# Patient Record
Sex: FEMALE | Race: BLACK OR AFRICAN AMERICAN | Employment: FULL TIME | URBAN - METROPOLITAN AREA
[De-identification: names, ages, dates, MRNs, and addresses within clinical notes are randomized per-mention and may not be internally consistent; named-entity substitution may affect disease eponyms.]

---

## 2017-01-03 ENCOUNTER — THERAPY VISIT (OUTPATIENT)
Dept: PHYSICAL THERAPY | Facility: CLINIC | Age: 37
End: 2017-01-03
Payer: COMMERCIAL

## 2017-01-03 ENCOUNTER — OFFICE VISIT (OUTPATIENT)
Dept: MATERNAL FETAL MEDICINE | Facility: CLINIC | Age: 37
End: 2017-01-03
Attending: OBSTETRICS & GYNECOLOGY
Payer: COMMERCIAL

## 2017-01-03 VITALS
HEART RATE: 70 BPM | RESPIRATION RATE: 20 BRPM | BODY MASS INDEX: 27.97 KG/M2 | SYSTOLIC BLOOD PRESSURE: 117 MMHG | WEIGHT: 194.9 LBS | DIASTOLIC BLOOD PRESSURE: 57 MMHG

## 2017-01-03 DIAGNOSIS — Z98.891 S/P CESAREAN SECTION: ICD-10-CM

## 2017-01-03 DIAGNOSIS — O09.819 PREGNANCY, HIGH-RISK, ASSISTED REPRODUCTIVE TECHNOLOGY: Primary | ICD-10-CM

## 2017-01-03 DIAGNOSIS — M25.552 HIP PAIN, LEFT: Primary | ICD-10-CM

## 2017-01-03 PROCEDURE — 99211 OFF/OP EST MAY X REQ PHY/QHP: CPT | Mod: 25,ZF

## 2017-01-03 PROCEDURE — 97110 THERAPEUTIC EXERCISES: CPT | Mod: GP | Performed by: PHYSICAL THERAPIST

## 2017-01-03 NOTE — PROGRESS NOTES
2 week Postpartum Visit Note    S:  Ms. Judd Nelson is a 36 year old  here for her postpartum visit, POD#14. Ms. Nelson is doing very well today, she is still ambulating with help of a walker, but continues to see PT and reports much improvement in pain and function of her hip. She has been tolerating much more of a diet, eating small meals throughout the day, though still doesn't usually feel very hungry. She denies incisional pain, and is no longer taking narcotic medications. Only reports minimal vaginal bleeding. She denies headache/chest pain or shortness of breath.     Babies are doing well in the NICU - feeding/growing; moved to [facility]. She has been pumping for them with some success. Denies breast soreness/tenderness.   We discussed contraception as well, Ms. Nelson would like paragard IUD after reviewing different methods and risks/benefits of each.       O:  /57 mmHg  Pulse 70  Resp 20  Wt 88.406 kg (194 lb 14.4 oz)  LMP 2016  Gen: Well-appearing, NAD  Breast: Lactating  Abd:  Benign, Soft, flat, non-tender and No masses, organomegaly  Inc:   well healed; c/d/i; steris remained; were removed    G tube site well healed  Ext:  warm, NT, neg edema    A/P:  Ms. Judd Nelson is a 36 year old  here for 2 week post partum visit after  delivery of a tri/tri triplet pregnancy, complicated by uterine atony and PPH. Pregnancy also complicated by GHTN. Recovering well today.  - VSS, normotensive; currently taking 200mg labetalol. Asymptomatic. Plan made today to discontinue labetalol and to continue blood pressure checks with report if noted elevated BPs. (140s - 160s/90s-110s).  - Contraception: Ms. Nelson would like the paragard IUD, scheduled today for a 6 week post partum visit with [facility].  - Continue PT for hip  - GI: Continues to tolerate diet well s/p G tube removal  - Feeding: Pumping for babies currently; planning to breastfeed.   - Follow-up: with [facility] at 6 week post  partum check    I acted as a scribe for Dr. Stiles during today's visit    E Hari FERRO  M Fellow    This note, as scribed, accurately reflects the examination, my impressions and plan as discussed with the patient.    Silva Stiles MD  Specialist in Maternal-Fetal Medicine

## 2017-01-03 NOTE — NURSING NOTE
RN F:F 15 min  Judd here for PPV due to preg c/b triplet delivery via C/S. Pt also has CHTN, and hip pain.  Pt reports that she is having some hip pain and is in PT weekly and doing exercises. Pt reports no more N/V and is eating and drinking well.  Judd is pumping breast milk for her babies.  Her babies are still in the NICU and were transferred to [facility].  Judd is walking with a walker and going to Pt weekly.  Dr. Stiles in to see pt.  Pt discussed birth control and was given a business card from [facility].  Judd left amb and stable. Cheyenne Kate RN

## 2017-01-13 ENCOUNTER — THERAPY VISIT (OUTPATIENT)
Dept: PHYSICAL THERAPY | Facility: CLINIC | Age: 37
End: 2017-01-13
Payer: COMMERCIAL

## 2017-01-13 DIAGNOSIS — M25.552 HIP PAIN, LEFT: Primary | ICD-10-CM

## 2017-01-13 PROCEDURE — 97110 THERAPEUTIC EXERCISES: CPT | Mod: GP | Performed by: PHYSICAL THERAPIST

## 2017-01-31 ENCOUNTER — OFFICE VISIT (OUTPATIENT)
Dept: OBGYN | Facility: CLINIC | Age: 37
End: 2017-01-31
Attending: MIDWIFE
Payer: COMMERCIAL

## 2017-01-31 VITALS
WEIGHT: 194.4 LBS | BODY MASS INDEX: 27.83 KG/M2 | DIASTOLIC BLOOD PRESSURE: 80 MMHG | SYSTOLIC BLOOD PRESSURE: 123 MMHG | HEIGHT: 70 IN

## 2017-01-31 DIAGNOSIS — Z30.430 ENCOUNTER FOR IUD INSERTION: Primary | ICD-10-CM

## 2017-01-31 LAB
HCG UR QL: NEGATIVE
INTERNAL QC OK POCT: YES

## 2017-01-31 PROCEDURE — 58300 INSERT INTRAUTERINE DEVICE: CPT | Mod: ZF | Performed by: MIDWIFE

## 2017-01-31 PROCEDURE — 81025 URINE PREGNANCY TEST: CPT | Mod: ZF | Performed by: MIDWIFE

## 2017-01-31 PROCEDURE — 25000125 ZZHC RX 250: Mod: ZF

## 2017-01-31 PROCEDURE — 99212 OFFICE O/P EST SF 10 MIN: CPT | Mod: 25,ZF

## 2017-01-31 RX ORDER — COPPER 313.4 MG/1
1 INTRAUTERINE DEVICE INTRAUTERINE ONCE
Qty: 1 EACH | Refills: 0
Start: 2017-01-31 | End: 2017-01-31

## 2017-01-31 ASSESSMENT — ANXIETY QUESTIONNAIRES
7. FEELING AFRAID AS IF SOMETHING AWFUL MIGHT HAPPEN: NOT AT ALL
1. FEELING NERVOUS, ANXIOUS, OR ON EDGE: NOT AT ALL
3. WORRYING TOO MUCH ABOUT DIFFERENT THINGS: NOT AT ALL
6. BECOMING EASILY ANNOYED OR IRRITABLE: NOT AT ALL
2. NOT BEING ABLE TO STOP OR CONTROL WORRYING: NOT AT ALL
GAD7 TOTAL SCORE: 1
5. BEING SO RESTLESS THAT IT IS HARD TO SIT STILL: NOT AT ALL

## 2017-01-31 ASSESSMENT — PATIENT HEALTH QUESTIONNAIRE - PHQ9: 5. POOR APPETITE OR OVEREATING: SEVERAL DAYS

## 2017-01-31 ASSESSMENT — PAIN SCALES - GENERAL: PAINLEVEL: NO PAIN (0)

## 2017-01-31 NOTE — Clinical Note
"2017       RE: Judd Nelson  [address] [facility] [address]     Dear Colleague,    Thank you for referring your patient, Judd Nelson, to the [facility] at [facility]. Please see a copy of my visit note below.      SUBJECTIVE: Judd Nelson is a 36 year old  female, requests paragard contraception   See hx c/s of triplets 6 weeks ago     Verification of Procedure:  Just before the procedure began through verbal and active participation of team members, I verified:     Initials   Patient Name dr   Patient  dr   Procedure to be performed dr     Consent:  Risks, benefits of treatment, and alternative options for contraception were discussed.  Patient's questions were elicited and answered.  Written consent was obtained and scanned into medical record.       OBJECTIVE: /80 mmHg  Ht 1.778 m (5' 10\")  Wt 88.179 kg (194 lb 6.4 oz)  BMI 27.89 kg/m2  LMP 2016    Pelvic Exam:  EG/BUS: Normal genital architecture without lesions, erythema or abnormal secretions. Bartholin's, Urethra, [place]'s glands are normal.   Vagina: moist, pink, rugae with creamy, white and odorlesssecretions  Cervix: Nulliparous,, no lesions and pink, moist, closed, without lesion or CMT  Uterus: anteverted,  and small, smooth, firm, mobile w/o pain   Adnexa: Within normal limits and No masses, nodularity, tenderness  Rectum: anus normal      PROCEDURE NOTE  --  ParaGard Insertion    Reason for Insertion:  contraception.    Pregnancy test: Negative    Counseling:  Patient counseled on efficacy, benefits, risks, potential side effects of IUD.  Insertion procedure and risk of infection, perforation, and spontaneous expulsion reviewed.   Advised to plan removal and/or replacement of IUD in 10 years from today or when desired.       Judd  was pre-medicated with ibuprofen  prior to beginning the procedure.      Under sterile technique, cervix was visualized with a " medium Graves speculum and prepped with Betadine solution. The uterus was sounded to 7 cm. The ParaGard IUD insertion apparatus was prepared and placed through the cervix without significant resistance and deployed at the fundus in the usual fashion. The strings were trimmed 3 cm from the external os.      Device Lot #: [id number]  Device Expiration Date: 2023     EBL:  Minimal     Complications:  None      Judd Nelson tolerated procedure well.    PLAN:      Written information on IUD use reviewed and given.  Symptoms to report reviewed. To report heavy bleeding, severe cramping, or abnormal vaginal discharge.  May take Ibuprofen 400-800 mg PO TID PRN or Naproxen 500 mg PO BID for cramping. Reminded to check for IUD strings every month.  Return to clinic in 4-6 weeks for string check. Judd Nelson  verbalized understanding of instructions.    Nursing Notes:   Yuli Lozada LPN  2017  2:28 PM  Signed  SUBJECTIVE:   Judd Nelson is here for her 6-week postpartum checkup.     PHQ-9 score:   Hx of Abuse:  No    Delivery Date: 2016.    Delivering provider:  Dr. Alfaro.    Type of delivery:  .  TrIplets   Delivery complications: blood lose  Was given two units during surgery  Then one more unit two days later.  Some abdmonial tenderness  Infant gender:  Boy Skinny, weight 3 pounds 12 oz. Boy Hamza  3#8 oz    GIrl Ambika  3#  Feeding Method:   and Bottlefed.  Complications reported with feeding:  Triplets lots of time.    Bleeding:  None.  Duration:  5-6 weeks  Had spotting yesterday .  Menses resumed:  No  Bowel/Urinary problems:  No    Contraception Planned:  IUD Paragard  She  has had intercourse since delivery and experienced  moderate discomfort.    Used condoms.       ================================================================  ROS: 10 point ROS neg other than the symptoms noted above in the HPI.   CONSTITUTIONAL: negative  RESPIRATORY: negative  CARDIOVASCULAR: negative  GI:  "negative  : negative  MUSCULO-SKELETAL: negative  SKIN: negative  ALLERGY/IMMUN: negative  PSYCHIATRIC: negative    EXAM:  /80 mmHg  Ht 1.778 m (5' 10\")  Wt 88.179 kg (194 lb 6.4 oz)  BMI 27.89 kg/m2  LMP 02/20/2016    General: healthy, alert and no distress  Psych: NEGATIVE  Last PHQ-9 score on record= 2  Breasts:  Lactating, Nipples intact with no lesions, Non-tender and No S/S of yeast or mastitis  Abdomen: Benign, Soft, flat, non-tender, No masses, organomegaly and Diastasis less than 1-2 FB  Incision:  well healed   Vulva:  Normal genitalia and Bartholin's, Urethra, [place]'s normal  Vagina:  normal with good muscle tone and without discharge  Cervix:  Multiparous,, no lesions and pink, moist, closed, without lesion or CMT.    Uterus:  fully involuted and non-tender    Adnexa:  Within normal limits and No masses, nodularity, tenderness  Recto-vaginal:   anus normal  See separate procedure note for IUD placement         ASSESSMENT:   Encounter Diagnosis   Name Primary?     Encounter for IUD insertion Yes      Normal postpartum exam after c/s for obstetrical complications  Pregnancy was complicated by: severe hypermemesis  GI tube PICC extended hospitalizations , transaminitis, hepatitis C< cholestasis, chronic HTN .      PLAN:  Orders Placed This Encounter   Procedures     hCG qual urine POCT            Risks and benefits of prescribed medications discussed.  Medication instructions reviewed.  Discussed calcium intake, vitamins and supplements including Vitamin D  Exercise encouraged  Follow up in 1 year      Diya ROBBINS             "

## 2017-01-31 NOTE — NURSING NOTE
SUBJECTIVE:   Judd Nelson is here for her 6-week postpartum checkup.     PHQ-9 score:   Hx of Abuse:  No    Delivery Date: 2016.    Delivering provider:  Dr. Alfaro.    Type of delivery:  .  TrIplets   Delivery complications: blood lose  Was given two units during surgery  Then one more unit two days later.  Some abdmonial tenderness  Infant gender:  Boy Skinny, weight 3 pounds 12 oz. Boy Juno  3#8 oz    GIrl Ambika  3#  Feeding Method:   and Bottlefed.  Complications reported with feeding:  Triplets lots of time.    Bleeding:  None.  Duration:  5-6 weeks  Had spotting yesterday .  Menses resumed:  No  Bowel/Urinary problems:  No    Contraception Planned:  IUD Paragard  She  has had intercourse since delivery and experienced  moderate discomfort.    Used condoms.

## 2017-01-31 NOTE — PROGRESS NOTES
"  SUBJECTIVE: Judd Nelson is a 36 year old  female, requests paragard contraception   See hx c/s of triplets 6 weeks ago     Verification of Procedure:  Just before the procedure began through verbal and active participation of team members, I verified:     Initials   Patient Name dr   Patient     Procedure to be performed      Consent:  Risks, benefits of treatment, and alternative options for contraception were discussed.  Patient's questions were elicited and answered.  Written consent was obtained and scanned into medical record.       OBJECTIVE: /80 mmHg  Ht 1.778 m (5' 10\")  Wt 88.179 kg (194 lb 6.4 oz)  BMI 27.89 kg/m2  LMP 2016    Pelvic Exam:  EG/BUS: Normal genital architecture without lesions, erythema or abnormal secretions. Bartholin's, Urethra, [place]'s glands are normal.   Vagina: moist, pink, rugae with creamy, white and odorlesssecretions  Cervix: Nulliparous,, no lesions and pink, moist, closed, without lesion or CMT  Uterus: anteverted,  and small, smooth, firm, mobile w/o pain   Adnexa: Within normal limits and No masses, nodularity, tenderness  Rectum: anus normal      PROCEDURE NOTE  --  ParaGard Insertion    Reason for Insertion:  contraception.    Pregnancy test: Negative    Counseling:  Patient counseled on efficacy, benefits, risks, potential side effects of IUD.  Insertion procedure and risk of infection, perforation, and spontaneous expulsion reviewed.   Advised to plan removal and/or replacement of IUD in 10 years from today or when desired.       Judd  was pre-medicated with ibuprofen  prior to beginning the procedure.      Under sterile technique, cervix was visualized with a medium Graves speculum and prepped with Betadine solution. The uterus was sounded to 7 cm. The ParaGard IUD insertion apparatus was prepared and placed through the cervix without significant resistance and deployed at the fundus in the usual fashion. The strings were trimmed 3 cm from " "the external os.      Device Lot #: [id number]  Device Expiration Date: 2023     EBL:  Minimal     Complications:  None      Judd Nelson tolerated procedure well.    PLAN:      Written information on IUD use reviewed and given.  Symptoms to report reviewed. To report heavy bleeding, severe cramping, or abnormal vaginal discharge.  May take Ibuprofen 400-800 mg PO TID PRN or Naproxen 500 mg PO BID for cramping. Reminded to check for IUD strings every month.  Return to clinic in 4-6 weeks for string check. Judd Nelson  verbalized understanding of instructions.    Nursing Notes:   Yuli Lozada LPN  2017  2:28 PM  Signed  SUBJECTIVE:   Judd Nelson is here for her 6-week postpartum checkup.     PHQ-9 score:   Hx of Abuse:  No    Delivery Date: 2016.    Delivering provider:  Dr. Alfaro.    Type of delivery:  .  TrIplets   Delivery complications: blood lose  Was given two units during surgery  Then one more unit two days later.  Some abdmonial tenderness  Infant gender:  Boy Babb, weight 3 pounds 12 oz. Boy Hamza  3#8 oz    GIrl Ambika  3#  Feeding Method:   and Bottlefed.  Complications reported with feeding:  Triplets lots of time.    Bleeding:  None.  Duration:  5-6 weeks  Had spotting yesterday .  Menses resumed:  No  Bowel/Urinary problems:  No    Contraception Planned:  IUD Paragard  She  has had intercourse since delivery and experienced  moderate discomfort.    Used condoms.       ================================================================  ROS: 10 point ROS neg other than the symptoms noted above in the HPI.   CONSTITUTIONAL: negative  RESPIRATORY: negative  CARDIOVASCULAR: negative  GI: negative  : negative  MUSCULO-SKELETAL: negative  SKIN: negative  ALLERGY/IMMUN: negative  PSYCHIATRIC: negative    EXAM:  /80 mmHg  Ht 1.778 m (5' 10\")  Wt 88.179 kg (194 lb 6.4 oz)  BMI 27.89 kg/m2  LMP 2016    General: healthy, alert and no distress  Psych: NEGATIVE  Last " PHQ-9 score on record= 2  Breasts:  Lactating, Nipples intact with no lesions, Non-tender and No S/S of yeast or mastitis  Abdomen: Benign, Soft, flat, non-tender, No masses, organomegaly and Diastasis less than 1-2 FB  Incision:  well healed   Vulva:  Normal genitalia and Bartholin's, Urethra, [place]'s normal  Vagina:  normal with good muscle tone and without discharge  Cervix:  Multiparous,, no lesions and pink, moist, closed, without lesion or CMT.    Uterus:  fully involuted and non-tender    Adnexa:  Within normal limits and No masses, nodularity, tenderness  Recto-vaginal:   anus normal  See separate procedure note for IUD placement         ASSESSMENT:   Encounter Diagnosis   Name Primary?     Encounter for IUD insertion Yes      Normal postpartum exam after c/s for obstetrical complications  Pregnancy was complicated by: severe hypermemesis  GI tube PICC extended hospitalizations , transaminitis, hepatitis C< cholestasis, chronic HTN .      PLAN:  Orders Placed This Encounter   Procedures     hCG qual urine POCT            Risks and benefits of prescribed medications discussed.  Medication instructions reviewed.  Discussed calcium intake, vitamins and supplements including Vitamin D  Exercise encouraged  Follow up in 1 year  Diya Lau CNM APRN

## 2017-02-01 ASSESSMENT — ANXIETY QUESTIONNAIRES: GAD7 TOTAL SCORE: 1

## 2017-02-01 ASSESSMENT — PATIENT HEALTH QUESTIONNAIRE - PHQ9: SUM OF ALL RESPONSES TO PHQ QUESTIONS 1-9: 5

## 2017-02-02 ENCOUNTER — TELEPHONE (OUTPATIENT)
Dept: OBGYN | Facility: CLINIC | Age: 37
End: 2017-02-02

## 2017-02-02 NOTE — TELEPHONE ENCOUNTER
Spoke to Judd who had IUD placed 1/31/17 and experiencing vaginal bleeding like her period. Reassured  bleeding can be heaviest in first 3 months after insertion. It is not heavier than normal period and not soaking thru maxi pad in an hr and no clots larger than golf ball. She is experiencing mild cramping. Instructed to call back if meets any of the listed criteria.Pt indicated understanding and agreed with plan.

## 2017-03-20 ENCOUNTER — OFFICE VISIT (OUTPATIENT)
Dept: OBGYN | Facility: CLINIC | Age: 37
End: 2017-03-20
Attending: MIDWIFE
Payer: COMMERCIAL

## 2017-03-20 DIAGNOSIS — Z30.431 IUD CHECK UP: Primary | ICD-10-CM

## 2017-03-20 PROCEDURE — 99212 OFFICE O/P EST SF 10 MIN: CPT | Mod: ZF

## 2017-03-20 NOTE — MR AVS SNAPSHOT
After Visit Summary   3/20/2017    Judd Nelson    MRN: [id number]           Patient Information     Date Of Birth          1980        Visit Information        Provider Department      3/20/2017 2:15 PM Alicia Lau APRN CNM [facility]        Today's Diagnoses     IUD check up    -  1      Care Instructions      IUD pamphlet reviewed and given to patient.        Follow-ups after your visit        Follow-up notes from your care team     Return in about 1 year (around 3/20/2018).      Who to contact     Please call your clinic at [phone] to:    Ask questions about your health    Make or cancel appointments    Discuss your medicines    Learn about your test results    Speak to your doctor   If you have compliments or concerns about an experience at your clinic, or if you wish to file a complaint, please contact [facility] Patient Relations at [phone] or email us at [email or web address]         Additional Information About Your Visit        MyChart Information     [employer] gives you secure access to your electronic health record. If you see a primary care provider, you can also send messages to your care team and make appointments. If you have questions, please call your primary care clinic.  If you do not have a primary care provider, please call [phone] and they will assist you.      [employer] is an electronic gateway that provides easy, online access to your medical records. With [employer], you can request a clinic appointment, read your test results, renew a prescription or communicate with your care team.     To access your existing account, please contact your [facility] or call [phone] for assistance.        Care EveryWhere ID     This is your Care EveryWhere ID. This could be used by other organizations to access your [facility] medical records  [id number]         Blood Pressure from  Last 3 Encounters:   01/31/17 123/80   01/03/17 117/57   12/22/16 133/79    Weight from Last 3 Encounters:   01/31/17 88.2 kg (194 lb 6.4 oz)   01/03/17 88.4 kg (194 lb 14.4 oz)   12/20/16 92.5 kg (203 lb 14.4 oz)              Today, you had the following     No orders found for display       Primary Care Provider Office Phone # Fax #    Vonda Pop [phone]       [facility]    [address] [facility] [address]  [place] [address]        Thank you!     Thank you for choosing [facility]  for your care. Our goal is always to provide you with excellent care. Hearing back from our patients is one way we can continue to improve our services. Please take a few minutes to complete the written survey that you may receive in the mail after your visit with us. Thank you!             Your Updated Medication List - Protect others around you: Learn how to safely use, store and throw away your medicines at www.disposemymeds.org.          This list is accurate as of: 3/20/17  3:18 PM.  Always use your most recent med list.                   Brand Name Dispense Instructions for use    ibuprofen 600 MG tablet    ADVIL/MOTRIN    60 tablet    Take 1 tablet (600 mg) by mouth every 6 hours as needed for moderate pain

## 2017-03-20 NOTE — PROGRESS NOTES
S:  Pt is here for IUD string check.  Requesting to have the strings trimmed today.  Spouse is able to feel strings during intercourse.  Pt has triplet babies 3 mos old  Mom is still here helping  Her mother in law will be coming soon.  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   She has had 2 menses since placement.   She is happy with the method.  O:  Spec exam  Strings visualized at os.  Trimmed to 1.5cm.   Normal scant d/c  A:  IUD string check  P:  RTC prn .    Diya Lau CNM APRN   ROS    S:  Pt is here for IUD string check.  Requesting to have the strings trimmed today.  Spouse is able to feel strings during intercourse.  Pt has triplet babies 3 mos old  Mom is still here helping  Her mother in law will be coming soon.  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   She has had 2 menses since placement.   She is happy with the method.  O:  Spec exam  Strings visualized at os.  Trimmed to 1.5cm.   Normal scant d/c  A:  IUD string check  P:  RTC prn .    Diya Lau CNM APRN

## 2017-03-20 NOTE — LETTER
3/20/2017       RE: Judd Nelson  [address] [facility] [address]     Dear Colleague,    Thank you for referring your patient, Judd Nelson, to the [facility] at [facility]. Please see a copy of my visit note below.      S:  Pt is here for IUD string check.  Requesting to have the strings trimmed today.  Spouse is able to feel strings during intercourse.  Pt has triplet babies 3 mos old  Mom is still here helping  Her mother in law will be coming soon.  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   She has had 2 menses since placement.   She is happy with the method.  O:  Spec exam  Strings visualized at os.  Trimmed to 1.5cm.   Normal scant d/c  A:  IUD string check  P:  RTC prn .    Diya Lau CNM APRN   ROS    S:  Pt is here for IUD string check.  Requesting to have the strings trimmed today.  Spouse is able to feel strings during intercourse.  Pt has triplet babies 3 mos old  Mom is still here helping  Her mother in law will be coming soon.  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   She has had 2 menses since placement.   She is happy with the method.  O:  Spec exam  Strings visualized at os.  Trimmed to 1.5cm.   Normal scant d/c  A:  IUD string check  P:  RTC prn .

## 2017-04-12 ENCOUNTER — MEDICAL CORRESPONDENCE (OUTPATIENT)
Dept: HEALTH INFORMATION MANAGEMENT | Facility: CLINIC | Age: 37
End: 2017-04-12

## 2018-03-27 ENCOUNTER — OFFICE VISIT (OUTPATIENT)
Dept: FAMILY MEDICINE | Facility: CLINIC | Age: 38
End: 2018-03-27
Payer: COMMERCIAL

## 2018-03-27 VITALS
HEIGHT: 69 IN | DIASTOLIC BLOOD PRESSURE: 60 MMHG | SYSTOLIC BLOOD PRESSURE: 92 MMHG | BODY MASS INDEX: 34.44 KG/M2 | TEMPERATURE: 97.5 F | OXYGEN SATURATION: 98 % | WEIGHT: 232.5 LBS | HEART RATE: 71 BPM

## 2018-03-27 DIAGNOSIS — D64.9 ANEMIA, UNSPECIFIED TYPE: ICD-10-CM

## 2018-03-27 DIAGNOSIS — Z00.00 ROUTINE HISTORY AND PHYSICAL EXAMINATION OF ADULT: Primary | ICD-10-CM

## 2018-03-27 DIAGNOSIS — Z13.29 SCREENING FOR THYROID DISORDER: ICD-10-CM

## 2018-03-27 DIAGNOSIS — K75.9 HEPATITIS: ICD-10-CM

## 2018-03-27 DIAGNOSIS — R74.8 ELEVATED LIVER ENZYMES: ICD-10-CM

## 2018-03-27 DIAGNOSIS — Z97.5 IUD (INTRAUTERINE DEVICE) IN PLACE: ICD-10-CM

## 2018-03-27 DIAGNOSIS — Z13.220 LIPID SCREENING: ICD-10-CM

## 2018-03-27 LAB
ALBUMIN SERPL-MCNC: 3.9 G/DL (ref 3.4–5)
ALP SERPL-CCNC: 81 U/L (ref 40–150)
ALT SERPL W P-5'-P-CCNC: 17 U/L (ref 0–50)
ANION GAP SERPL CALCULATED.3IONS-SCNC: 8 MMOL/L (ref 3–14)
AST SERPL W P-5'-P-CCNC: 17 U/L (ref 0–45)
BILIRUB SERPL-MCNC: 0.3 MG/DL (ref 0.2–1.3)
BUN SERPL-MCNC: 11 MG/DL (ref 7–30)
CALCIUM SERPL-MCNC: 8.7 MG/DL (ref 8.5–10.1)
CHLORIDE SERPL-SCNC: 108 MMOL/L (ref 94–109)
CHOLEST SERPL-MCNC: 179 MG/DL
CO2 SERPL-SCNC: 22 MMOL/L (ref 20–32)
CREAT SERPL-MCNC: 0.42 MG/DL (ref 0.52–1.04)
ERYTHROCYTE [DISTWIDTH] IN BLOOD BY AUTOMATED COUNT: 14.4 % (ref 10–15)
FERRITIN SERPL-MCNC: 10 NG/ML (ref 12–150)
GFR SERPL CREATININE-BSD FRML MDRD: >90 ML/MIN/1.7M2
GLUCOSE SERPL-MCNC: 84 MG/DL (ref 70–99)
HCT VFR BLD AUTO: 40.5 % (ref 35–47)
HCV AB SERPL QL IA: REACTIVE
HDLC SERPL-MCNC: 55 MG/DL
HGB BLD-MCNC: 12.9 G/DL (ref 11.7–15.7)
LDLC SERPL CALC-MCNC: 108 MG/DL
MCH RBC QN AUTO: 28.3 PG (ref 26.5–33)
MCHC RBC AUTO-ENTMCNC: 31.9 G/DL (ref 31.5–36.5)
MCV RBC AUTO: 89 FL (ref 78–100)
NONHDLC SERPL-MCNC: 124 MG/DL
PLATELET # BLD AUTO: 288 10E9/L (ref 150–450)
POTASSIUM SERPL-SCNC: 4.4 MMOL/L (ref 3.4–5.3)
PROT SERPL-MCNC: 7.4 G/DL (ref 6.8–8.8)
RBC # BLD AUTO: 4.56 10E12/L (ref 3.8–5.2)
SODIUM SERPL-SCNC: 138 MMOL/L (ref 133–144)
TRIGL SERPL-MCNC: 79 MG/DL
TSH SERPL DL<=0.005 MIU/L-ACNC: 0.57 MU/L (ref 0.4–4)
WBC # BLD AUTO: 7.7 10E9/L (ref 4–11)

## 2018-03-27 PROCEDURE — 85027 COMPLETE CBC AUTOMATED: CPT | Performed by: INTERNAL MEDICINE

## 2018-03-27 PROCEDURE — 80061 LIPID PANEL: CPT | Performed by: INTERNAL MEDICINE

## 2018-03-27 PROCEDURE — 86803 HEPATITIS C AB TEST: CPT | Performed by: INTERNAL MEDICINE

## 2018-03-27 PROCEDURE — 36415 COLL VENOUS BLD VENIPUNCTURE: CPT | Performed by: INTERNAL MEDICINE

## 2018-03-27 PROCEDURE — 84443 ASSAY THYROID STIM HORMONE: CPT | Performed by: INTERNAL MEDICINE

## 2018-03-27 PROCEDURE — 99213 OFFICE O/P EST LOW 20 MIN: CPT | Mod: 25 | Performed by: INTERNAL MEDICINE

## 2018-03-27 PROCEDURE — 80053 COMPREHEN METABOLIC PANEL: CPT | Performed by: INTERNAL MEDICINE

## 2018-03-27 PROCEDURE — 87522 HEPATITIS C REVRS TRNSCRPJ: CPT | Performed by: INTERNAL MEDICINE

## 2018-03-27 PROCEDURE — 82728 ASSAY OF FERRITIN: CPT | Performed by: INTERNAL MEDICINE

## 2018-03-27 PROCEDURE — 99395 PREV VISIT EST AGE 18-39: CPT | Performed by: INTERNAL MEDICINE

## 2018-03-27 NOTE — PROGRESS NOTES
SUBJECTIVE:   CC: Judd Nelson is an 37 year old woman who presents for preventive health visit.     Healthy Habits:    Do you get at least three servings of calcium containing foods daily (dairy, green leafy vegetables, etc.)? 1 daily    Amount of exercise or daily activities, outside of work: trying treadmill daily    Problems taking medications regularly not applicable    Medication side effects: No    Have you had an eye exam in the past two years? yes    Do you see a dentist twice per year? yes    Do you have sleep apnea, excessive snoring or daytime drowsiness?no    IUD Complications  Patient is complaining of menorrhagia and dyspareunia since IUD insertion  Wants it removed    Today's PHQ-2 Score:   PHQ-2 (  [employer]) 3/27/2018 3/22/2016   Q1: Little interest or pleasure in doing things 0 0   Q2: Feeling down, depressed or hopeless 0 0   PHQ-2 Score 0 0       Abuse: Current or Past(Physical, Sexual or Emotional)- No  Do you feel safe in your environment - Yes    Social History   Substance Use Topics     Smoking status: Never Smoker     Smokeless tobacco: Never Used     Alcohol use No     If you drink alcohol do you typically have >3 drinks per day or >7 drinks per week? No                     Reviewed orders with patient.  Reviewed health maintenance and updated orders accordingly - Yes  Labs reviewed in EPIC  Patient Active Problem List   Diagnosis     Obesity     Advance Care Planning     S/P  section     Hip pain, left     Past Surgical History:   Procedure Laterality Date      SECTION N/A 2016    Procedure:  SECTION;  Surgeon: Cheyenne Alfaro MD;  Location:  [facility]     ENDOSCOPIC INSERTION TUBE JEJUNOSTOMY N/A 2016    Procedure: ENDOSCOPIC INSERTION TUBE JEJUNOSTOMY;  Surgeon: Chet Tavera MD;  Location: [facility]     GYN SURGERY      hysteroscopic polypectomy     HC REPLACE DUODENOSTOMY/JEJUNOSTOMY TUBE PERCUTANEOUS N/A 2016    Procedure: REPLACE  GASTROJEJUNOSTOMY TUBE, PERCUTANEOUS;  Surgeon: Weston Rios MD;  Location: [facility]     PERCUTANEOUS BIOPSY LIVER N/A 9/8/2016    Procedure: PERCUTANEOUS BIOPSY LIVER;  Surgeon: Weston Rios MD;  Location: [facility]       Social History   Substance Use Topics     Smoking status: Never Smoker     Smokeless tobacco: Never Used     Alcohol use No     Family History   Problem Relation Age of Onset     DIABETES Father      Hypertension Father      Dementia Father      Neurologic Disorder Brother      epilepsy     Neurologic Disorder Brother      epilepsy     CANCER Mother      Endometrial     Hypertension Mother      DIABETES Sister      Cardiovascular Maternal Grandmother          Current Outpatient Prescriptions   Medication Sig Dispense Refill     ibuprofen (ADVIL/MOTRIN) 600 MG tablet Take 1 tablet (600 mg) by mouth every 6 hours as needed for moderate pain 60 tablet 1     Allergies   Allergen Reactions     No Known Allergies        Mammogram not appropriate for this patient based on age.    Pertinent mammograms are reviewed under the imaging tab.  History of abnormal Pap smear:   NO - age 30- 65 PAP every 3 years recommended  Last 3 Pap and HPV Results:   PAP / HPV 12/1/2015 3/16/2011 1/15/2009   PAP NIL NIL NIL       Reviewed and updated as needed this visit by clinical staff  Tobacco  Allergies  Meds  Soc Hx        Reviewed and updated as needed this visit by Provider            ROS:  C: NEGATIVE for fever, chills, change in weight  I: NEGATIVE for worrisome rashes, moles or lesions  E: NEGATIVE for vision changes or irritation  ENT: NEGATIVE for ear, mouth and throat problems  R: NEGATIVE for significant cough or SOB  B: NEGATIVE for masses, tenderness or discharge  CV: NEGATIVE for chest pain, palpitations or peripheral edema  GI: NEGATIVE for nausea, abdominal pain, heartburn, or change in bowel habits  : POSITIVE for menorrhagia, dyspareunia  M: NEGATIVE for significant arthralgias or  "myalgia  N: NEGATIVE for weakness, dizziness or paresthesias  P: NEGATIVE for changes in mood or affect    This document serves as a record of the services and decisions personally performed and made by Vonda Pop MD. It was created on her behalf by Cheyenne Richter, a trained medical scribe. The creation of this document is based on the provider's statements to the medical scribe.  Cheyenne Richter 8:51 AM March 27, 2018    OBJECTIVE:   BP 92/60 (BP Location: Right arm, Cuff Size: Adult Large)  Pulse 71  Temp 97.5  F (36.4  C) (Oral)  Ht 1.74 m (5' 8.5\")  Wt 105.5 kg (232 lb 8 oz)  LMP 03/11/2018 (Exact Date)  SpO2 98%  BMI 34.84 kg/m2  EXAM:  GENERAL: healthy, alert and no distress  EYES: Eyes grossly normal to inspection, PERRL and conjunctivae and sclerae normal  HENT: ear canals and TM's normal, nose and mouth without ulcers or lesions  NECK: no adenopathy, no asymmetry, masses, or scars and thyroid normal to palpation  RESP: lungs clear to auscultation - no rales, rhonchi or wheezes  BREAST: left breast prominently larger than right breast, no abnormal masses felt, tenderness or nipple discharge and no palpable axillary masses or adenopathy  This difference is since birth  CV: regular rate and rhythm, normal S1 S2, no S3 or S4, no murmur, click or rub, no peripheral edema and peripheral pulses strong  ABDOMEN: soft, nontender, no hepatosplenomegaly, no masses and bowel sounds normal  MS: no gross musculoskeletal defects noted, no edema  SKIN: no suspicious lesions or rashes  NEURO: Normal strength and tone, mentation intact and speech normal  PSYCH: mentation appears normal, affect normal/bright   exam deferred to gynecology as she will see them for removal of IUD  ASSESSMENT/PLAN:   Judd was seen today for physical.    Diagnoses and all orders for this visit:    Routine history and physical examination of adult  Patient came in today for a wellness visit  Was treated for infertility, and had " "triplets recently    IUD (intrauterine device) in place  -     OB/GYN REFERRAL  Patient is complaining of menorrhagia and dyspareunia since IUD insertion  Wants it removed  Referred her to [facility]  She will schedule it at her convenience  Explained to her that her period will probably become regular after removal  Discussed mirena IUD but patient declined.    Anemia, unspecified type  -     CBC with platelets  -     Ferritin    Hepatitis( past history during pregnancy and patient had work up done)  -     Comprehensive metabolic panel  -     Hepatitis C Screen Reflex to HCV RNA Quant and Genotype  Patient had a falsely positive Hepatitis C test before  Re-check so it can be taken off patient's problem list    Screening for thyroid disorder  -     TSH with free T4 reflex    Lipid screening  -     Lipid panel reflex to direct LDL Non-fasting    Elevated liver enzymes  -     Comprehensive metabolic panel  -     Hepatitis C Screen Reflex to HCV RNA Quant and Genotype  Patient had history of elevated liver enzymes during pregnency so will recheck      Patient Instructions   Labs today  Schedule an appointment for OB/GYN at your earliest convenience  Follow up in one year for physical  Seek sooner medical attention if there is any worsening of symptoms or problems    COUNSELING:   Reviewed preventive health counseling, as reflected in patient instructions       Regular exercise       Healthy diet/nutrition         reports that she has never smoked. She has never used smokeless tobacco.    Estimated body mass index is 34.84 kg/(m^2) as calculated from the following:    Height as of this encounter: 1.74 m (5' 8.5\").    Weight as of this encounter: 105.5 kg (232 lb 8 oz).   Weight management plan: Discussed healthy diet and exercise guidelines and patient will follow up in 12 months in clinic to re-evaluate.    Counseling Resources:  ATP IV Guidelines  Pooled Cohorts Equation Calculator  Breast Cancer Risk " Calculator  FRAX Risk Assessment  ICSI Preventive Guidelines  Dietary Guidelines for Americans, 2010  USDA's MyPlate  ASA Prophylaxis  Lung CA Screening    The information in this document, created by the medical scribe for me, accurately reflects the services I personally performed and the decisions made by me. I have reviewed and approved this document for accuracy prior to leaving the patient care area.  March 27, 2018 9:10 AM    Vonda Pop MD  [facility]

## 2018-03-27 NOTE — MR AVS SNAPSHOT
After Visit Summary   3/27/2018    Judd Nelson    MRN: [id number]           Patient Information     Date Of Birth          1980        Visit Information        Provider Department      3/27/2018 8:30 AM Vonda Pop MD [facility]        Today's Diagnoses     Routine history and physical examination of adult    -  1    IUD (intrauterine device) in place        Anemia, unspecified type        Hepatitis        Screening for thyroid disorder        Lipid screening        Elevated liver enzymes          Care Instructions      Preventive Health Recommendations  Female Ages 26 - 39  Yearly exam:   See your health care provider every year in order to    Review health changes.     Discuss preventive care.      Review your medicines if you your doctor has prescribed any.    Until age 30: Get a Pap test every three years (more often if you have had an abnormal result).    After age 30: Talk to your doctor about whether you should have a Pap test every 3 years or have a Pap test with HPV screening every 5 years.   You do not need a Pap test if your uterus was removed (hysterectomy) and you have not had cancer.  You should be tested each year for STDs (sexually transmitted diseases), if you're at risk.   Talk to your provider about how often to have your cholesterol checked.  If you are at risk for diabetes, you should have a diabetes test (fasting glucose).  Shots: Get a flu shot each year. Get a tetanus shot every 10 years.   Nutrition:     Eat at least 5 servings of fruits and vegetables each day.    Eat whole-grain bread, whole-wheat pasta and brown rice instead of white grains and rice.    Talk to your provider about Calcium and Vitamin D.     Lifestyle    Exercise at least 150 minutes a week (30 minutes a day, 5 days of the week). This will help you control your weight and prevent disease.    Limit alcohol to one drink per day.    No smoking.     Wear sunscreen to prevent skin  cancer.    See your dentist every six months for an exam and cleaning.    Labs today  Schedule an appointment for OB/GYN at your earliest convenience  Follow up in one year for physical  Seek sooner medical attention if there is any worsening of symptoms or problems  Your BMI is Body mass index is 34.84 kg/(m^2).  Weight management is a personal decision.  If you are interested in exploring weight loss strategies, the following discussion covers the approaches that may be successful. Body mass index (BMI) is one way to tell whether you are at a healthy weight, overweight, or obese. It measures your weight in relation to your height.  A BMI of 18.5 to 24.9 is in the healthy range. A person with a BMI of 25 to 29.9 is considered overweight, and someone with a BMI of 30 or greater is considered obese. More than two-thirds of American adults are considered overweight or obese.  Being overweight or obese increases the risk for further weight gain. Excess weight may lead to heart disease and diabetes.  Creating and following plans for healthy eating and physical activity may help you improve your health.  Weight control is part of healthy lifestyle and includes exercise, emotional health, and healthy eating habits. Careful eating habits lifelong are the mainstay of weight control. Though there are significant health benefits from weight loss, long-term weight loss with diet alone may be very difficult to achieve- studies show long-term success with dietary management in less than 10% of people. Attaining a healthy weight may be especially difficult to achieve in those with severe obesity. In some cases, medications, devices and surgical management might be considered.  What can you do?  If you are overweight or obese and are interested in methods for weight loss, you should discuss this with your provider.     Consider reducing daily calorie intake by 500 calories.     Keep a food journal.     Avoiding skipping meals,  consider cutting portions instead.    Diet combined with exercise helps maintain muscle while optimizing fat loss. Strength training is particularly important for building and maintaining muscle mass. Exercise helps reduce stress, increase energy, and improves fitness. Increasing exercise without diet control, however, may not burn enough calories to loose weight.       Start walking three days a week 10-20 minutes at a time    Work towards walking thirty minutes five days a week     Eventually, increase the speed of your walking for 1-2 minutes at time    In addition, we recommend that you review healthy lifestyles and methods for weight loss available through the National Institutes of Health patient information sites:  http://win.niddk.nih.gov/publications/index.htm    And look into health and wellness programs that may be available through your health insurance provider, employer, local community center, or dominic club.                Follow-ups after your visit        Additional Services     OB/GYN REFERRAL       Your provider has referred you to:  [facility]: [facility] Manjeet Nichols [phone]  [email or web address]    Please be aware that coverage of these services is subject to the terms and limitations of your health insurance plan.  Call member services at your health plan with any benefit or coverage questions.      Please bring the following with you to your appointment:    (1) Any X-Rays, CTs or MRIs which have been performed.  Contact the facility where they were done to arrange for  prior to your scheduled appointment.   (2) List of current medications   (3) This referral request   (4) Any documents/labs given to you for this referral                  Who to contact     If you have questions or need follow up information about today's clinic visit or your schedule please contact [facility] directly at [phone].  Normal or non-critical lab  "and imaging results will be communicated to you by MyChart, letter or phone within 4 business days after the clinic has received the results. If you do not hear from us within 7 days, please contact the clinic through [employer] or phone. If you have a critical or abnormal lab result, we will notify you by phone as soon as possible.  Submit refill requests through [employer] or call your pharmacy and they will forward the refill request to us. Please allow 3 business days for your refill to be completed.          Additional Information About Your Visit        [email or web address] Information     [employer] gives you secure access to your electronic health record. If you see a primary care provider, you can also send messages to your care team and make appointments. If you have questions, please call your primary care clinic.  If you do not have a primary care provider, please call [phone] and they will assist you.        Care EveryWhere ID     This is your Care EveryWhere ID. This could be used by other organizations to access your [facility] medical records  [id number]        Your Vitals Were     Pulse Temperature Height Last Period Pulse Oximetry BMI (Body Mass Index)    71 97.5  F (36.4  C) (Oral) 5' 8.5\" (1.74 m) 03/11/2018 (Exact Date) 98% 34.84 kg/m2       Blood Pressure from Last 3 Encounters:   03/27/18 92/60   01/31/17 123/80   01/03/17 117/57    Weight from Last 3 Encounters:   03/27/18 232 lb 8 oz (105.5 kg)   01/31/17 194 lb 6.4 oz (88.2 kg)   01/03/17 194 lb 14.4 oz (88.4 kg)              We Performed the Following     CBC with platelets     Comprehensive metabolic panel     Ferritin     Hepatitis C Screen Reflex to HCV RNA Quant and Genotype     Lipid panel reflex to direct LDL Non-fasting     OB/GYN REFERRAL     TSH with free T4 reflex        Primary Care Provider Office Phone # Fax #    Vonda Pop [phone] [address]  [facility] [address]        Equal Access to " Services     [facility]: Hadii tiffanie Cedeno, wabravoda luqadaha, qaybta kaalmasmiley hunt, rajan herr. So [facility] [phone].    ATENCIÓN: Si habla español, tiene a greene disposición servicios gratuitos de asistencia lingüística. Llame al [phone].    We comply with applicable federal civil rights laws and [place] laws. We do not discriminate on the basis of race, color, national origin, age, disability, sex, sexual orientation, or gender identity.            Thank you!     Thank you for choosing [facility]  for your care. Our goal is always to provide you with excellent care. Hearing back from our patients is one way we can continue to improve our services. Please take a few minutes to complete the written survey that you may receive in the mail after your visit with us. Thank you!             Your Updated Medication List - Protect others around you: Learn how to safely use, store and throw away your medicines at www.disposemymeds.org.          This list is accurate as of 3/27/18  9:07 AM.  Always use your most recent med list.                   Brand Name Dispense Instructions for use Diagnosis    ibuprofen 600 MG tablet    ADVIL/MOTRIN    60 tablet    Take 1 tablet (600 mg) by mouth every 6 hours as needed for moderate pain    S/P  section

## 2018-03-27 NOTE — PATIENT INSTRUCTIONS
Preventive Health Recommendations  Female Ages 26 - 39  Yearly exam:   See your health care provider every year in order to    Review health changes.     Discuss preventive care.      Review your medicines if you your doctor has prescribed any.    Until age 30: Get a Pap test every three years (more often if you have had an abnormal result).    After age 30: Talk to your doctor about whether you should have a Pap test every 3 years or have a Pap test with HPV screening every 5 years.   You do not need a Pap test if your uterus was removed (hysterectomy) and you have not had cancer.  You should be tested each year for STDs (sexually transmitted diseases), if you're at risk.   Talk to your provider about how often to have your cholesterol checked.  If you are at risk for diabetes, you should have a diabetes test (fasting glucose).  Shots: Get a flu shot each year. Get a tetanus shot every 10 years.   Nutrition:     Eat at least 5 servings of fruits and vegetables each day.    Eat whole-grain bread, whole-wheat pasta and brown rice instead of white grains and rice.    Talk to your provider about Calcium and Vitamin D.     Lifestyle    Exercise at least 150 minutes a week (30 minutes a day, 5 days of the week). This will help you control your weight and prevent disease.    Limit alcohol to one drink per day.    No smoking.     Wear sunscreen to prevent skin cancer.    See your dentist every six months for an exam and cleaning.    Labs today  Schedule an appointment for OB/GYN at your earliest convenience  Follow up in one year for physical  Seek sooner medical attention if there is any worsening of symptoms or problems  Your BMI is Body mass index is 34.84 kg/(m^2).  Weight management is a personal decision.  If you are interested in exploring weight loss strategies, the following discussion covers the approaches that may be successful. Body mass index (BMI) is one way to tell whether you are at a healthy weight,  overweight, or obese. It measures your weight in relation to your height.  A BMI of 18.5 to 24.9 is in the healthy range. A person with a BMI of 25 to 29.9 is considered overweight, and someone with a BMI of 30 or greater is considered obese. More than two-thirds of American adults are considered overweight or obese.  Being overweight or obese increases the risk for further weight gain. Excess weight may lead to heart disease and diabetes.  Creating and following plans for healthy eating and physical activity may help you improve your health.  Weight control is part of healthy lifestyle and includes exercise, emotional health, and healthy eating habits. Careful eating habits lifelong are the mainstay of weight control. Though there are significant health benefits from weight loss, long-term weight loss with diet alone may be very difficult to achieve- studies show long-term success with dietary management in less than 10% of people. Attaining a healthy weight may be especially difficult to achieve in those with severe obesity. In some cases, medications, devices and surgical management might be considered.  What can you do?  If you are overweight or obese and are interested in methods for weight loss, you should discuss this with your provider.     Consider reducing daily calorie intake by 500 calories.     Keep a food journal.     Avoiding skipping meals, consider cutting portions instead.    Diet combined with exercise helps maintain muscle while optimizing fat loss. Strength training is particularly important for building and maintaining muscle mass. Exercise helps reduce stress, increase energy, and improves fitness. Increasing exercise without diet control, however, may not burn enough calories to loose weight.       Start walking three days a week 10-20 minutes at a time    Work towards walking thirty minutes five days a week     Eventually, increase the speed of your walking for 1-2 minutes at time    In  addition, we recommend that you review healthy lifestyles and methods for weight loss available through the National Institutes of Health patient information sites:  http://win.niddk.nih.gov/publications/index.htm    And look into health and wellness programs that may be available through your health insurance provider, employer, local community center, or dominic club.

## 2018-03-28 NOTE — PROGRESS NOTES
Alexandro Whaley,    This is to inform you regarding your test result.    I spoke to you on phone but sending you a result note also.  Ferritin which is iron stores in the body is low.  We want Ferritin level greater than   Take OTC Ferrous Sulfate 325 mg po once  daily if you tolerate.  Take with vit C as vit C helps to absorb iron.  Iron can make you constipated so take stool softener.  Recheck ferritin in 3- 4 months  TSH which is thyroid hormone is normal.  The testing of your kidney function, liver function and electrolytes was satisfactory   CBC result which includes Hemoglobin and  Platelet Counts is satisfactory.  Hepatitis C antibody is positive but confirmatory test is pending as I discussed with you  I will let you know when that result become available  Please make follow-up appointment in 3-4 months    Sincerely,      Dr.Nasima Kiesha MD,FACP

## 2018-03-29 LAB
HCV RNA SERPL NAA+PROBE-ACNC: NORMAL [IU]/ML
HCV RNA SERPL NAA+PROBE-LOG IU: NORMAL LOG IU/ML

## 2018-03-30 DIAGNOSIS — Z53.9 ERRONEOUS ENCOUNTER--DISREGARD: Primary | ICD-10-CM

## 2018-03-30 NOTE — PROGRESS NOTES
Alexandro Whaley,    This is to inform you regarding your test result.    I spoke to you on phone but sending you a result note also.  Hepatitis C antibody was reactive but  But hepatitis C RNA not detected.  You do not have any hepatitis C        Sincerely,      Dr.Nasima Kiesha MD,FACP

## 2018-04-04 ENCOUNTER — OFFICE VISIT (OUTPATIENT)
Dept: OBGYN | Facility: CLINIC | Age: 38
End: 2018-04-04
Payer: COMMERCIAL

## 2018-04-04 VITALS
SYSTOLIC BLOOD PRESSURE: 104 MMHG | BODY MASS INDEX: 34.13 KG/M2 | DIASTOLIC BLOOD PRESSURE: 60 MMHG | WEIGHT: 238.4 LBS | HEIGHT: 70 IN

## 2018-04-04 DIAGNOSIS — Z80.49 FAMILY HISTORY OF UTERINE CANCER: ICD-10-CM

## 2018-04-04 DIAGNOSIS — Z12.4 SCREENING FOR CERVICAL CANCER: ICD-10-CM

## 2018-04-04 DIAGNOSIS — N92.0 MENORRHAGIA WITH REGULAR CYCLE: Primary | ICD-10-CM

## 2018-04-04 DIAGNOSIS — Z30.432 ENCOUNTER FOR IUD REMOVAL: ICD-10-CM

## 2018-04-04 PROCEDURE — 99203 OFFICE O/P NEW LOW 30 MIN: CPT | Mod: 25 | Performed by: OBSTETRICS & GYNECOLOGY

## 2018-04-04 PROCEDURE — G0145 SCR C/V CYTO,THINLAYER,RESCR: HCPCS | Performed by: OBSTETRICS & GYNECOLOGY

## 2018-04-04 PROCEDURE — 58301 REMOVE INTRAUTERINE DEVICE: CPT | Performed by: OBSTETRICS & GYNECOLOGY

## 2018-04-04 PROCEDURE — 87624 HPV HI-RISK TYP POOLED RSLT: CPT | Performed by: OBSTETRICS & GYNECOLOGY

## 2018-04-04 RX ORDER — COPPER 313.4 MG/1
1 INTRAUTERINE DEVICE INTRAUTERINE ONCE
COMMUNITY

## 2018-04-04 ASSESSMENT — ANXIETY QUESTIONNAIRES
3. WORRYING TOO MUCH ABOUT DIFFERENT THINGS: NOT AT ALL
IF YOU CHECKED OFF ANY PROBLEMS ON THIS QUESTIONNAIRE, HOW DIFFICULT HAVE THESE PROBLEMS MADE IT FOR YOU TO DO YOUR WORK, TAKE CARE OF THINGS AT HOME, OR GET ALONG WITH OTHER PEOPLE: SOMEWHAT DIFFICULT
1. FEELING NERVOUS, ANXIOUS, OR ON EDGE: NOT AT ALL
6. BECOMING EASILY ANNOYED OR IRRITABLE: SEVERAL DAYS
7. FEELING AFRAID AS IF SOMETHING AWFUL MIGHT HAPPEN: SEVERAL DAYS
2. NOT BEING ABLE TO STOP OR CONTROL WORRYING: NOT AT ALL
GAD7 TOTAL SCORE: 3
5. BEING SO RESTLESS THAT IT IS HARD TO SIT STILL: NOT AT ALL

## 2018-04-04 ASSESSMENT — PATIENT HEALTH QUESTIONNAIRE - PHQ9: 5. POOR APPETITE OR OVEREATING: SEVERAL DAYS

## 2018-04-04 NOTE — MR AVS SNAPSHOT
"              After Visit Summary   4/4/2018    Judd Nelson    MRN: [id number]           Patient Information     Date Of Birth          1980        Visit Information        Provider Department      4/4/2018 2:40 PM Reshma Everett MD [facility] [place]        Today's Diagnoses     Menorrhagia with regular cycle    -  1    Screening for cervical cancer        Encounter for IUD removal        Family history of uterine cancer           Follow-ups after your visit        Who to contact     If you have questions or need follow up information about today's clinic visit or your schedule please contact [facility] [place] directly at [phone].  Normal or non-critical lab and imaging results will be communicated to you by MyChart, letter or phone within 4 business days after the clinic has received the results. If you do not hear from us within 7 days, please contact the clinic through [employer] or phone. If you have a critical or abnormal lab result, we will notify you by phone as soon as possible.  Submit refill requests through [employer] or call your pharmacy and they will forward the refill request to us. Please allow 3 business days for your refill to be completed.          Additional Information About Your Visit        MyChart Information     [employer] gives you secure access to your electronic health record. If you see a primary care provider, you can also send messages to your care team and make appointments. If you have questions, please call your primary care clinic.  If you do not have a primary care provider, please call [phone] and they will assist you.        Care EveryWhere ID     This is your Care EveryWhere ID. This could be used by other organizations to access your [facility] medical records  [id number]        Your Vitals Were     Height Last Period BMI (Body Mass Index)             5' 9.5\" (1.765 m) 03/12/2018 34.7 kg/m2          Blood Pressure from Last 3 Encounters: "   18 104/60   18 92/60   17 123/80    Weight from Last 3 Encounters:   18 238 lb 6.4 oz (108.1 kg)   18 232 lb 8 oz (105.5 kg)   17 194 lb 6.4 oz (88.2 kg)              We Performed the Following     HPV High Risk Types DNA Cervical     IUD REMOVAL     Pap imaged thin layer screen with HPV - recommended age 30 - 65        Primary Care Provider Office Phone # Fax #    Vonda MILLI Pop [phone] [address] [facility] [address]  [place]                [place] [address]        Equal Access to Services     [facility] : Hadii tiffanie Cedeno, steve burnham, jessica hunt, rajan herr. So [facility] [phone].    ATENCIÓN: Si habla español, tiene a greene disposición servicios gratuitos de asistencia lingüística. Llame al [phone].    We comply with applicable federal civil rights laws and [place] laws. We do not discriminate on the basis of race, color, national origin, age, disability, sex, sexual orientation, or gender identity.            Thank you!     Thank you for choosing [facility] [place]  for your care. Our goal is always to provide you with excellent care. Hearing back from our patients is one way we can continue to improve our services. Please take a few minutes to complete the written survey that you may receive in the mail after your visit with us. Thank you!             Your Updated Medication List - Protect others around you: Learn how to safely use, store and throw away your medicines at www.disposemymeds.org.          This list is accurate as of 18  3:34 PM.  Always use your most recent med list.                   Brand Name Dispense Instructions for use Diagnosis    ibuprofen 600 MG tablet    ADVIL/MOTRIN    60 tablet    Take 1 tablet (600 mg) by mouth every 6 hours as needed for moderate pain    S/P  section       paragard intrauterine copper      1 each by Intrauterine route once

## 2018-04-04 NOTE — PROGRESS NOTES
SUBJECTIVE:                                                   Judd Nelson is a 37 year old female who presents to clinic today for the following health issue(s):  Patient presents with:  IUD: patient would like IUD removed. has had paragard for a year now-causing heavy and long periods. 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   Patient is sexually active, .  Using IUD for contraception.    reports that she has never smoked. She has never used smokeless tobacco.    STD testing offered?  Declined    Health maintenance updated:  yes    Today's PHQ-2 Score:   PHQ-2 (  [employer]) 3/27/2018   Q1: Little interest or pleasure in doing things 0   Q2: Feeling down, depressed or hopeless 0   PHQ-2 Score 0     Today's PHQ-9 Score:   PHQ-9 SCORE 2018   Total Score -   Total Score 3     Today's JAZZY-7 Score:   JAZZY-7 SCORE 2018   Total Score 3       Problem list and histories reviewed & adjusted, as indicated.  Additional history: as documented.    Patient Active Problem List   Diagnosis     Obesity     Advance Care Planning     S/P  section     Hip pain, left     Family history of uterine cancer     Menorrhagia with regular cycle     Past Surgical History:   Procedure Laterality Date      SECTION N/A 2016    Procedure:  SECTION;  Surgeon: Cheyenne Alfaro MD;   "[facility]     ENDOSCOPIC INSERTION TUBE JEJUNOSTOMY N/A 8/9/2016    Procedure: ENDOSCOPIC INSERTION TUBE JEJUNOSTOMY;  Surgeon: Chet Tavera MD;  [facility] OR     GYN SURGERY      hysteroscopic polypectomy     HC REPLACE DUODENOSTOMY/JEJUNOSTOMY TUBE PERCUTANEOUS N/A 9/8/2016    Procedure: REPLACE GASTROJEJUNOSTOMY TUBE, PERCUTANEOUS;  Surgeon: Weston Rios MD;  Location: [facility] OR     PERCUTANEOUS BIOPSY LIVER N/A 9/8/2016    Procedure: PERCUTANEOUS BIOPSY LIVER;  Surgeon: Weston Rios MD;  Location: [facility] OR      Social History   Substance Use Topics     Smoking status: Never Smoker     Smokeless tobacco: Never Used     Alcohol use No      Problem (# of Occurrences) Relation (Name,Age of Onset)    CANCER (1) Mother: Endometrial    Cardiovascular (1) Maternal Grandmother    DIABETES (2) Father, Sister    Dementia (1) Father    Hypertension (2) Father, Mother    Neurologic Disorder (2) Brother: epilepsy, Brother: epilepsy            Current Outpatient Prescriptions   Medication Sig     paragard intrauterine copper 1 each by Intrauterine route once     ibuprofen (ADVIL/MOTRIN) 600 MG tablet Take 1 tablet (600 mg) by mouth every 6 hours as needed for moderate pain     No current facility-administered medications for this visit.      Allergies   Allergen Reactions     No Known Allergies        ROS:  12 point review of systems negative other than symptoms noted below.  Constitutional: Weight Gain  Genitourinary: Heavy Bleeding with Period and Painful Rembrandt    OBJECTIVE:     /60  Ht 5' 9.5\" (1.765 m)  Wt 238 lb 6.4 oz (108.1 kg)  LMP 03/12/2018  BMI 34.7 kg/m2  Body mass index is 34.7 kg/(m^2).    Exam:  Constitutional:  Appearance: Well nourished, well developed alert, in no acute distress  Chest:  Respiratory Effort:  Breathing unlabored  Gastrointestinal:  Abdominal Examination:  Abdomen nontender to palpation, tone normal without rigidity or guarding, no masses present, " umbilicus without lesions; Liver/Spleen:  No hepatomegaly present, liver nontender to palpation; Hernias:  No hernias present  Lymphatic: Lymph Nodes:  No other lymphadenopathy present  Skin:General Inspection:  No rashes present, no lesions present, no areas of discoloration; Genitalia and Groin:  No rashes present, no lesions present, no areas of discoloration, no masses present.  Neurologic/Psychiatric:  Mental Status:  Oriented X3   Pelvic Exam:  External Genitalia:     Normal appearance for age, no discharge present, no tenderness present, no inflammatory lesions present, color normal  Vagina:    Normal vaginal vault without central or paravaginal defects, no discharge present, no inflammatory lesions present, no masses present  Bladder:     Nontender to palpation  Urethra:   Urethral Body:  Urethra palpation normal, urethra structural support normal   Urethral Meatus:  No erythema or lesions present  Cervix:     Appearance healthy, no lesions present, nontender to palpation, no bleeding present, string present  Uterus:     Nontender to palpation, no masses present, position anteflexed, mobility: normal  Adnexa:     No adnexal tenderness present, no adnexal masses present  Perineum:     Perineum within normal limits, no evidence of trauma, no rashes or skin lesions present  Anus:     Anus within normal limits, no hemorrhoids present  Inguinal Lymph Nodes:     No lymphadenopathy present  Pubic Hair:     Normal pubic hair distribution for age  Genitalia and Groin:     No rashes present, no lesions present, no areas of discoloration, no masses present       In-Clinic Test Results:  No results found for this or any previous visit (from the past 24 hour(s)).    ASSESSMENT/PLAN:                                                        ICD-10-CM    1. Menorrhagia with regular cycle N92.0    2. Screening for cervical cancer Z12.4 Pap imaged thin layer screen with HPV - recommended age 30 - 65     HPV High Risk Types DNA  Cervical   3. Encounter for IUD removal Z30.432 IUD REMOVAL   4. Family history of uterine cancer Z80.49        There are no Patient Instructions on file for this visit.    Discussed menorrhagia, pap guidelines, family history of uterine cancer and her prior liver issues  Pap and hpv done  iud removed  Discussed fertility history  Reviewed her outside records and all prior lab results  Needs hpv testing q 5y, may be a good idea to do the pap more often since family history of uterine cancer  If her periods stay heavy despite iud removal, I would recommend further evaluation with Sonohistogram here and endometrial biopsy due to her mom getting uterine cancer that was very advanced at an abnormally young age.  Patient says her periods were ok before babies and iud.      Reshma Everett MD  [facility]        INDICATIONS:                                                      Is a pregnancy test required: No.  Was a consent obtained?  Yes    Judd Nelson is a 37 year old female,, Patient's last menstrual period was 2018. who presents today for IUD removal. Her current IUD was placed a year ago. She has had problems including heavy and long periods with the IUD. She requests removal of the IUD because of problems stated above    Today's PHQ-2 Score:   PHQ-2 (  [employer]) 3/27/2018   Q1: Little interest or pleasure in doing things 0   Q2: Feeling down, depressed or hopeless 0   PHQ-2 Score 0       PROCEDURE:                                                      A speculum exam was performed and the cervix was visualized. The IUD string was visualized. Using ring forceps, the string  was grasped and the IUD removed intact.    POST PROCEDURE:                                                      The patient experienced moderate discomfort during the procedure. Patient was discharged in stable condition.    Call if bleeding, pain or fever occur. and Birth control counseling given.    Reshma JEONG  MD [place]

## 2018-04-04 NOTE — LETTER
April 11, 2018    Judd Nelson  [address] [address]    Dear Judd,  We are happy to inform you that your PAP smear result from 04/04/18 is normal.  We are now able to do a follow up test on PAP smears. The DNA test is for HPV (Human Papilloma Virus). Cervical cancer is closely linked with certain types of HPV. Your results showed no evidence of high risk HPV.  Therefore we recommend you return in 3 years for your next pap smear.  You will still need to return to the clinic every year for an annual exam and other preventive tests.  Please contact the clinic at [phone] with any questions.  Sincerely,    Reshma Everett MD/[facility]

## 2018-04-05 ASSESSMENT — PATIENT HEALTH QUESTIONNAIRE - PHQ9: SUM OF ALL RESPONSES TO PHQ QUESTIONS 1-9: 3

## 2018-04-05 ASSESSMENT — ANXIETY QUESTIONNAIRES: GAD7 TOTAL SCORE: 3

## 2018-04-06 LAB
COPATH REPORT: NORMAL
PAP: NORMAL

## 2018-04-09 LAB
FINAL DIAGNOSIS: NORMAL
HPV HR 12 DNA CVX QL NAA+PROBE: NEGATIVE
HPV16 DNA SPEC QL NAA+PROBE: NEGATIVE
HPV18 DNA SPEC QL NAA+PROBE: NEGATIVE
SPECIMEN DESCRIPTION: NORMAL
SPECIMEN SOURCE CVX/VAG CYTO: NORMAL

## 2018-10-16 NOTE — PROGRESS NOTES
"Subjective:  HPI                    Objective:  System    Physical Exam    General     ROS    Assessment/Plan:    DISCHARGE REPORT    Progress reporting period is from 12-27-16 to 1-13-17. (Pt was seen for 3 PT visits in this timeframe.)    SUBJECTIVE  Subjective: \"Much better.\" Walking without walker now. Can lift leg in/out of bed without UE assist. Dressing is easier. Doing stretches 3/xday.   Current Pain level: 2/10   Initial Pain level: 8/10   Changes in function: Yes, see goal flow sheet for change in function   Adverse reactions: None;   ,     Patient has failed to return to therapy so current objective findings are unknown.  The subjective and objective information are from the last SOAP note on this patient.    OBJECTIVE  Objective: Gait: no AD, mild decreased stance time on left. Able to lie supine with left hip down flat into normal extension. PROM: L hip flex=90 degrees, then painful in groin. IR and ER PROM cont per eval. Repeated hip ext in kneeling and ER with foot on stool both improve hip motion. Progressed to strengthening.       ASSESSMENT/PLAN  Updated problem list and treatment plan: Diagnosis 1:  Hip pain    STG/LTGs have been met or progress has been made towards goals:  Yes (See Goal flow sheet.)  Assessment of Progress: The patient has not returned to therapy. Current status is unknown.  Self Management Plans:  Patient has been instructed in a home treatment program.  Patient  has been instructed in self management of symptoms.    [facility] continues to require the following intervention to meet STG and LTG's: PT intervention is no longer required to meet STG/LTG.  The patient failed to complete scheduled/ordered appointments so current information is unknown.  We will discharge this patient from PT.  At her last visit she was improving well and stated she would call if she needed more help.    Recommendations:  This patient is ready to be discharged from therapy and continue their home " treatment program.    Please refer to the daily flowsheet for treatment today, total treatment time and time spent performing 1:1 timed codes.

## 2019-12-15 ENCOUNTER — ANCILLARY PROCEDURE (OUTPATIENT)
Dept: GENERAL RADIOLOGY | Facility: CLINIC | Age: 39
End: 2019-12-15
Attending: STUDENT IN AN ORGANIZED HEALTH CARE EDUCATION/TRAINING PROGRAM
Payer: COMMERCIAL

## 2019-12-15 ENCOUNTER — OFFICE VISIT (OUTPATIENT)
Dept: URGENT CARE | Facility: URGENT CARE | Age: 39
End: 2019-12-15
Payer: COMMERCIAL

## 2019-12-15 VITALS
RESPIRATION RATE: 20 BRPM | WEIGHT: 252 LBS | BODY MASS INDEX: 36.08 KG/M2 | SYSTOLIC BLOOD PRESSURE: 110 MMHG | DIASTOLIC BLOOD PRESSURE: 70 MMHG | HEIGHT: 70 IN | TEMPERATURE: 99.6 F | HEART RATE: 86 BPM | OXYGEN SATURATION: 99 %

## 2019-12-15 DIAGNOSIS — J18.9 PNEUMONIA OF BOTH LOWER LOBES DUE TO INFECTIOUS ORGANISM: ICD-10-CM

## 2019-12-15 DIAGNOSIS — J18.9 PNEUMONIA OF BOTH LOWER LOBES DUE TO INFECTIOUS ORGANISM: Primary | ICD-10-CM

## 2019-12-15 PROCEDURE — 71046 X-RAY EXAM CHEST 2 VIEWS: CPT

## 2019-12-15 PROCEDURE — 99203 OFFICE O/P NEW LOW 30 MIN: CPT | Performed by: STUDENT IN AN ORGANIZED HEALTH CARE EDUCATION/TRAINING PROGRAM

## 2019-12-15 RX ORDER — AZITHROMYCIN 250 MG/1
TABLET, FILM COATED ORAL
Qty: 6 TABLET | Refills: 0 | Status: SHIPPED | OUTPATIENT
Start: 2019-12-15 | End: 2019-12-19

## 2019-12-15 ASSESSMENT — ENCOUNTER SYMPTOMS
ABDOMINAL PAIN: 1
ARTHRALGIAS: 1
SORE THROAT: 1
COUGH: 1
VOMITING: 0
CONSTIPATION: 0
TROUBLE SWALLOWING: 0
DYSURIA: 0
SINUS PRESSURE: 0
SHORTNESS OF BREATH: 1
SEIZURES: 0
EYE PAIN: 0
NAUSEA: 0
HEMATURIA: 0
BACK PAIN: 1
SINUS PAIN: 0
DIARRHEA: 0
PALPITATIONS: 0
COLOR CHANGE: 0
CHILLS: 0
FATIGUE: 1
FEVER: 1

## 2019-12-15 ASSESSMENT — MIFFLIN-ST. JEOR: SCORE: 1900.37

## 2019-12-15 NOTE — PROGRESS NOTES
SUBJECTIVE:   Judd Nelson is a 39 year old female presenting with a chief complaint of   Chief Complaint   Patient presents with     Cough     cough for past week,fever for 2 days       She is an established patient of [facility].    URI Adult    Onset of symptoms was 3 day(s) ago.  Course of illness is worsening.    Severity moderate  Current and Associated symptoms: fever of 102-101, chills, cough - productive, shortness of breath, sore throat, body aches and fatigue. Patient notes pain with deep inspiration  Treatment measures tried include Tylenol/Ibuprofen and Fluids.  Predisposing factors include ill contact: Family member- children and  sick .  Patient denies any ear or face pain. Denies nausea vomiting diarrhea and constipation      Review of Systems   Constitutional: Positive for fatigue and fever. Negative for chills.   HENT: Positive for sore throat. Negative for ear pain, sinus pressure, sinus pain and trouble swallowing.    Eyes: Negative for pain and visual disturbance.   Respiratory: Positive for cough and shortness of breath.    Cardiovascular: Positive for chest pain. Negative for palpitations.   Gastrointestinal: Positive for abdominal pain. Negative for constipation, diarrhea, nausea and vomiting.   Genitourinary: Negative for dysuria and hematuria.   Musculoskeletal: Positive for arthralgias and back pain.   Skin: Negative for color change and rash.   Neurological: Negative for seizures and syncope.   All other systems reviewed and are negative.      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      Positive PPD      Severe hyperemesis gravidarum     GI tube placed during triplet  "pregnancy loss of 50#     Transaminitis     liver bx 9/8/16 resolving hepatitis     Family History   Problem Relation Age of Onset     Diabetes Father      Hypertension Father      Dementia Father      Neurologic Disorder Brother         epilepsy     Neurologic Disorder Brother         epilepsy     Cancer Mother         Endometrial     Hypertension Mother      Diabetes Sister      Cardiovascular Maternal Grandmother      Current Outpatient Medications   Medication Sig Dispense Refill     azithromycin (ZITHROMAX) 250 MG tablet Take 2 tablets (500 mg) by mouth daily for 1 day, THEN 1 tablet (250 mg) daily for 4 days. 6 tablet 0     ibuprofen (ADVIL/MOTRIN) 600 MG tablet Take 1 tablet (600 mg) by mouth every 6 hours as needed for moderate pain 60 tablet 1     paragard intrauterine copper 1 each by Intrauterine route once       Social History     Tobacco Use     Smoking status: Never Smoker     Smokeless tobacco: Never Used   Substance Use Topics     Alcohol use: No     Alcohol/week: 0.0 standard drinks       OBJECTIVE  /70 (Cuff Size: Adult Large)   Pulse 86   Temp 99.6  F (37.6  C) (Oral)   Resp 20   Ht 1.765 m (5' 9.5\")   Wt 114.3 kg (252 lb)   SpO2 99%   BMI 36.68 kg/m      Physical Exam   Constitutional: Awake, alert, cooperative, no apparent distress, and appears stated age.  Eyes: Lids and lashes normal, sclera clear, conjunctiva normal.  ENT: Normocephalic, without obvious abnormality, atramatic, TMs clear with good landmarks, tonsils 2 + with no erythema or exudate  Lungs: No increased work of breathing, good air exchange, mild right crackles heard over left lower lobe, no wheezing. Clear otherwise  Cardiovascular: Regular rate and rhythm, normal S1 and S2, no S3 or S4, and no murmur noted.  Abdomen:normal bowel sounds, soft, non-distended, non-tender, no masses palpated, no hepatosplenomegally.  Musculoskeletal: No redness, warmth, or swelling of the joints.  Full range of motion noted. " Neurologic: Awake, alert, oriented to name, place and time.  Cranial nerves II-XII are grossly intact and gait is normal.    Labs:  No results found for this or any previous visit (from the past 24 hour(s)).    X-Ray was done, my findings are: Left lower lobe pneumonia    ASSESSMENT:      ICD-10-CM    1. Pneumonia of both lower lobes due to infectious organism (H) J18.1 XR Chest 2 Views     azithromycin (ZITHROMAX) 250 MG tablet        Medical Decision Making:    Differential Diagnosis:  URI Adult/Peds:  Bronchitis-viral, Influenza, Pneumonia and Viral upper respiratory illness    Serious Comorbid Conditions:  Adult:  None    PLAN:    URI Adult:  Tylenol, Ibuprofen, Fluids, Rest and Rx pneumonia - healthy  Azithromycin Z-tara     Followup:    If not improving or if condition worsens, follow up with your Primary Care Provider, In 1  week(s) follow up with  your Primary Care Provider    There are no Patient Instructions on file for this visit.     Maryse Aaron DO

## 2019-12-19 ENCOUNTER — OFFICE VISIT (OUTPATIENT)
Dept: FAMILY MEDICINE | Facility: CLINIC | Age: 39
End: 2019-12-19
Payer: COMMERCIAL

## 2019-12-19 VITALS
WEIGHT: 254 LBS | HEIGHT: 70 IN | OXYGEN SATURATION: 98 % | DIASTOLIC BLOOD PRESSURE: 72 MMHG | RESPIRATION RATE: 16 BRPM | SYSTOLIC BLOOD PRESSURE: 101 MMHG | TEMPERATURE: 97.6 F | BODY MASS INDEX: 36.36 KG/M2 | HEART RATE: 74 BPM

## 2019-12-19 DIAGNOSIS — J18.9 PNEUMONIA OF BOTH LUNGS DUE TO INFECTIOUS ORGANISM, UNSPECIFIED PART OF LUNG: Primary | ICD-10-CM

## 2019-12-19 PROCEDURE — 99213 OFFICE O/P EST LOW 20 MIN: CPT | Performed by: NURSE PRACTITIONER

## 2019-12-19 ASSESSMENT — MIFFLIN-ST. JEOR: SCORE: 1917.39

## 2019-12-19 NOTE — NURSING NOTE
"Chief Complaint   Patient presents with     Urgent Care     Follow up      /72 (BP Location: Left arm, Patient Position: Sitting, Cuff Size: Adult Regular)   Pulse 74   Temp 97.6  F (36.4  C) (Oral)   Resp 16   Ht 1.778 m (5' 10\")   Wt 115.2 kg (254 lb)   SpO2 98%   Breastfeeding No   BMI 36.45 kg/m   Estimated body mass index is 36.45 kg/m  as calculated from the following:    Height as of this encounter: 1.778 m (5' 10\").    Weight as of this encounter: 115.2 kg (254 lb).  bp completed using cuff size: regular wrist       Romeo Contreras CMA, MA     "

## 2019-12-19 NOTE — PROGRESS NOTES
Subjective     Judd Nelson is a 39 year old female who presents to clinic today for the following health issues:    HPI   ED/UC Followup:    Facility:  [facility]  Date of visit: 12/15/19  Reason for visit: Cough   Current Status: Not feeling completely better--still have pressure in chest, coughing and hard to take a deep breathe.       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      Positive PPD      Severe hyperemesis gravidarum     GI tube placed during triplet pregnancy loss of 50#     Transaminitis     liver bx 16 resolving hepatitis     Family History   Problem Relation Age of Onset     Diabetes Father      Hypertension Father      Dementia Father      Neurologic Disorder Brother         epilepsy     Neurologic Disorder Brother         epilepsy     Cancer Mother         Endometrial     Hypertension Mother      Diabetes Sister      Cardiovascular Maternal Grandmother      Past Surgical History:   Procedure Laterality Date      SECTION N/A 2016    Procedure:  SECTION;  Surgeon: Cheyenne Alfaro MD;  [facility]     ENDOSCOPIC INSERTION TUBE JEJUNOSTOMY N/A 2016    Procedure: ENDOSCOPIC INSERTION TUBE JEJUNOSTOMY;  Surgeon: Chet Tavera MD;  [facility]     GYN SURGERY      hysteroscopic polypectomy     HC REPLACE DUODENOSTOMY/JEJUNOSTOMY TUBE PERCUTANEOUS N/A 2016    Procedure: REPLACE GASTROJEJUNOSTOMY  "TUBE, PERCUTANEOUS;  Surgeon: Weston Rios MD;  [facility]     PERCUTANEOUS BIOPSY LIVER N/A 9/8/2016    Procedure: PERCUTANEOUS BIOPSY LIVER;  Surgeon: Weston Rios MD;  [facility] OR     Social History     Tobacco Use     Smoking status: Never Smoker     Smokeless tobacco: Never Used   Substance Use Topics     Alcohol use: No     Alcohol/week: 0.0 standard drinks     Current Outpatient Medications   Medication Sig Dispense Refill     ibuprofen (ADVIL/MOTRIN) 600 MG tablet Take 1 tablet (600 mg) by mouth every 6 hours as needed for moderate pain 60 tablet 1     paragard intrauterine copper 1 each by Intrauterine route once       Allergies   Allergen Reactions     No Known Allergies        Reviewed and updated as needed this visit by clinical staff and provider     Review of Systems   Detailed as above         Objective    /72 (BP Location: Left arm, Patient Position: Sitting, Cuff Size: Adult Regular)   Pulse 74   Temp 97.6  F (36.4  C) (Oral)   Resp 16   Ht 1.778 m (5' 10\")   Wt 115.2 kg (254 lb)   SpO2 98%   Breastfeeding No   BMI 36.45 kg/m      Physical Exam  Constitutional:       Appearance: She is well-developed.   Cardiovascular:      Rate and Rhythm: Normal rate.   Pulmonary:      Effort: Pulmonary effort is normal.      Breath sounds: Wheezing (left lower lobe) present.   Neurological:      Mental Status: She is alert.   Psychiatric:         Mood and Affect: Mood normal.            Assessment & Plan       ICD-10-CM    1. Pneumonia of both lungs due to infectious organism, unspecified part of lung J18.9         Feeling much improved after treatment for pneumonia. We will continue to watch and wait. We discussed that she will not feel 100% right away. Continue with OTC meds prn. Call or rtc prn       ITZEL Lucero [facility]"

## 2020-02-23 ENCOUNTER — HEALTH MAINTENANCE LETTER (OUTPATIENT)
Age: 40
End: 2020-02-23

## 2020-12-06 ENCOUNTER — HEALTH MAINTENANCE LETTER (OUTPATIENT)
Age: 40
End: 2020-12-06

## 2021-04-11 ENCOUNTER — HEALTH MAINTENANCE LETTER (OUTPATIENT)
Age: 41
End: 2021-04-11

## 2021-04-13 ENCOUNTER — VIRTUAL VISIT (OUTPATIENT)
Dept: SLEEP MEDICINE | Facility: CLINIC | Age: 41
End: 2021-04-13
Payer: COMMERCIAL

## 2021-04-13 VITALS — BODY MASS INDEX: 35.79 KG/M2 | HEIGHT: 70 IN | WEIGHT: 250 LBS

## 2021-04-13 DIAGNOSIS — R06.83 SNORING: Primary | ICD-10-CM

## 2021-04-13 PROCEDURE — 99203 OFFICE O/P NEW LOW 30 MIN: CPT | Mod: 95 | Performed by: INTERNAL MEDICINE

## 2021-04-13 ASSESSMENT — MIFFLIN-ST. JEOR: SCORE: 1894.24

## 2021-04-13 NOTE — PATIENT INSTRUCTIONS
Your BMI is Body mass index is 35.87 kg/m .  Weight management is a personal decision.  If you are interested in exploring weight loss strategies, the following discussion covers the approaches that may be successful. Body mass index (BMI) is one way to tell whether you are at a healthy weight, overweight, or obese. It measures your weight in relation to your height.  A BMI of 18.5 to 24.9 is in the healthy range. A person with a BMI of 25 to 29.9 is considered overweight, and someone with a BMI of 30 or greater is considered obese. More than two-thirds of American adults are considered overweight or obese.  Being overweight or obese increases the risk for further weight gain. Excess weight may lead to heart disease and diabetes.  Creating and following plans for healthy eating and physical activity may help you improve your health.  Weight control is part of healthy lifestyle and includes exercise, emotional health, and healthy eating habits. Careful eating habits lifelong are the mainstay of weight control. Though there are significant health benefits from weight loss, long-term weight loss with diet alone may be very difficult to achieve- studies show long-term success with dietary management in less than 10% of people. Attaining a healthy weight may be especially difficult to achieve in those with severe obesity. In some cases, medications, devices and surgical management might be considered.  What can you do?  If you are overweight or obese and are interested in methods for weight loss, you should discuss this with your provider.     Consider reducing daily calorie intake by 500 calories.     Keep a food journal.     Avoiding skipping meals, consider cutting portions instead.    Diet combined with exercise helps maintain muscle while optimizing fat loss. Strength training is particularly important for building and maintaining muscle mass. Exercise helps reduce stress, increase energy, and improves fitness.  "Increasing exercise without diet control, however, may not burn enough calories to loose weight.       Start walking three days a week 10-20 minutes at a time    Work towards walking thirty minutes five days a week     Eventually, increase the speed of your walking for 1-2 minutes at time    In addition, we recommend that you review healthy lifestyles and methods for weight loss available through the National Institutes of Health patient information sites:  http://win.niddk.nih.gov/publications/index.htm    And look into health and wellness programs that may be available through your health insurance provider, employer, local community center, or dominic club.    Weight management plan: Patient was referred to their PCP to discuss a diet and exercise plan.  MY TREATMENT INFORMATION FOR SLEEP APNEA-  Judd Nelson    DOCTOR : Luiz Duggan MD    Am I having a sleep study at a sleep center?  --->Due to insurance clearance delays, you will be contacted within 2-4 weeks to schedule    Am I having a home sleep study?  --->Watch the video for the device you are using:    /drop off device-   https://www.[employer].com/watch?v=yGGFBdELGhk    Disposable device sent out require phone/computer application-   https://www.[employer].com/watch?v=BCce_vbiwxE      Frequently asked questions:  1. What is Obstructive Sleep Apnea (JSEUS)? JESUS is the most common type of sleep apnea. Apnea means, \"without breath.\"  Apnea is most often caused by narrowing or collapse of the upper airway as muscles relax during sleep.   Almost everyone has occasional apneas. Most people with sleep apnea have had brief interruptions at night frequently for many years.  The severity of sleep apnea is related to how frequent and severe the events are.   2. What are the consequences of JESUS? Symptoms include: feeling sleepy during the day, snoring loudly, gasping or stopping of breathing, trouble sleeping, and occasionally morning " headaches or heartburn at night.  Sleepiness can be serious and even increase the risk of falling asleep while driving. Other health consequences may include development of high blood pressure and other cardiovascular disease in persons who are susceptible. Untreated JESUS  can contribute to heart disease, stroke and diabetes.   3. What are the treatment options? In most situations, sleep apnea is a lifelong disease that must be managed with daily therapy. Medications are not effective for sleep apnea and surgery is generally not considered until other therapies have been tried. Your treatment is your choice . Continuous Positive Airway (CPAP) works right away and is the therapy that is effective in nearly everyone. An oral device to hold your jaw forward is usually the next most reliable option. Other options include postioning devices (to keep you off your back), weight loss, and surgery including a tongue pacing device. There is more detail about some of these options below.  4. Are my sleep studies covered by insurance? Although we will request verification of coverage, we advise you also check in advance of the study to ensure there is coverage.    Important tips for those choosing CPAP and similar devices   Know your equipment:  CPAP is continuous positive airway pressure that prevents obstructive sleep apnea by keeping the throat from collapsing while you are sleeping. In most cases, the device is  smart  and can slowly self-adjusts if your throat collapses and keeps a record every day of how well you are treated-this information is available to you and your care team.  BPAP is bilevel positive airway pressure that keeps your throat open and also assists each breath with a pressure boost to maintain adequate breathing.  Special kinds of BPAP are used in patients who have inadequate breathing from lung or heart disease. In most cases, the device is  smart  and can slowly self-adjusts to assist breathing. Like  CPAP, the device keeps a record of how well you are treated.  Your mask is your connection to the device. You get to choose what feels most comfortable and the staff will help to make sure if fits. Here: are some examples of the different masks that are available:       Key points to remember on your journey with sleep apnea:  1. Sleep study.  PAP devices often need to be adjusted during a sleep study to show that they are effective and adjusted right.  2. Good tips to remember: Try wearing just the mask during a quiet time during the day so your body adapts to wearing it. A humidifier is recommended for comfort in most cases to prevent drying of your nose and throat. Allergy medication from your provider may help you if you are having nasal congestion.  3. Getting settled-in. It takes more than one night for most of us to get used to wearing a mask. Try wearing just the mask during a quiet time during the day so your body adapts to wearing it. A humidifier is recommended for comfort in most cases. Our team will work with you carefully on the first day and will be in contact within 4 days and again at 2 and 4 weeks for advice and remote device adjustments. Your therapy is evaluated by the device each day.   4. Use it every night. The more you are able to sleep naturally for 7-8 hours, the more likely you will have good sleep and to prevent health risks or symptoms from sleep apnea. Even if you use it 4 hours it helps. Occasionally all of us are unable to use a medical therapy, in sleep apnea, it is not dangerous to miss one night.   5. Communicate. Call our skilled team on the number provided on the first day if your visit for problems that make it difficult to wear the device. Over 2 out of 3 patients can learn to wear the device long-term with help from our team. Remember to call our team or your sleep providers if you are unable to wear the device as we may have other solutions for those who cannot adapt to mask  CPAP therapy. It is recommended that you sleep your sleep provider within the first 3 months and yearly after that if you are not having problems.   6. Use it for your health. We encourage use of CPAP masks during daytime quiet periods to allow your face and brain to adapt to the sensation of CPAP so that it will be a more natural sensation to awaken to at night or during naps. This can be very useful during the first few weeks or months of adapting to CPAP though it does not help medically to wear CPAP during wakefulness and  should not be used as a strategy just to meet guidelines.  7. Take care of your equipment. Make sure you clean your mask and tubing using directions every day and that your filter and mask are replaced as recommended or if they are not working.     BESIDES CPAP, WHAT OTHER THERAPIES ARE THERE?    Positioning Device  Positioning devices are generally used when sleep apnea is mild and only occurs on your back.This example shows a pillow that straps around the waist. It may be appropriate for those whose sleep study shows milder sleep apnea that occurs primarily when lying flat on one's back. Preliminary studies have shown benefit but effectiveness at home may need to be verified by a home sleep test. These devices are generally not covered by medical insurance.  Examples of devices that maintain sleeping on the back to prevent snoring and mild sleep apnea.    Belt type body positioner  [email or web address]    Electronic reminder  http://nightshifttherapy.com/  [email or web address]/      Oral Appliance  What is oral appliance therapy?  An oral appliance device fits on your teeth at night like a retainer used after having braces. The device is made by a specialized dentist and requires several visits over 1-2 months before a manufactured device is made to fit your teeth and is adjusted to prevent your sleep apnea. Once an oral device is working properly, snoring should be improved. A home  sleep test may be recommended at that time if to determine whether the sleep apnea is adequately treated.       Some things to remember:  -Oral devices are often, but not always, covered by your medical insurance. Be sure to check with your insurance provider.   -If you are referred for oral therapy, you will be given a list of specialized dentists to consider or you may choose to visit the Web site of the American Academy of Dental Sleep Medicine  -Oral devices are less likely to work if you have severe sleep apnea or are extremely overweight.     More detailed information  An oral appliance is a small acrylic device that fits over the upper and lower teeth  (similar to a retainer or a mouth guard). This device slightly moves jaw forward, which moves the base of the tongue forward, opens the airway, improves breathing for effective treat snoring and obstructive sleep apnea in perhaps 7 out of 10 people .  The best working devices are custom-made by a dental device  after a mold is made of the teeth 1, 2, 3.  When is an oral appliance indicated?  Oral appliance therapy is recommended as a first-line treatment for patients with primary snoring, mild sleep apnea, and for patients with moderate sleep apnea who prefer appliance therapy to use of CPAP4, 5. Severity of sleep apnea is determined by sleep testing and is based on the number of respiratory events per hour of sleep.   How successful is oral appliance therapy?  The success rate of oral appliance therapy in patients with mild sleep apnea is 75-80% while in patients with moderate sleep apnea it is 50-70%. The chance of success in patients with severe sleep apnea is 40-50%. The research also shows that oral appliances have a beneficial effect on the cardiovascular health of JESUS patients at the same magnitude as CPAP therapy7.  Oral appliances should be a second-line treatment in cases of severe sleep apnea, but if not completely successful then a  combination therapy utilizing CPAP plus oral appliance therapy may be effective. Oral appliances tend to be effective in a broad range of patients although studies show that the patients who have the highest success are females, younger patients, those with milder disease, and less severe obesity. 3, 6.   Finding a dentist that practices dental sleep medicine  Specific training is available through the American Academy of Dental Sleep Medicine for dentists interested in working in the field of sleep. To find a dentist who is educated in the field of sleep and the use of oral appliances, near you, visit the Web site of the American Academy of Dental Sleep Medicine.    References  1. Gregoria et al. Objectively measured vs self-reported compliance during oral appliance therapy for sleep-disordered breathing. Chest 2013; 144(5): 2103-1019.  2. Suzie et al. Objective measurement of compliance during oral appliance therapy for sleep-disordered breathing. Thorax 2013; 68(1): 91-96.  3. Zaki, et al. Mandibular advancement devices in 620 men and women with JESUS and snoring: tolerability and predictors of treatment success. Chest 2004; 125: 9009-0940.  4. Samson, et al. Oral appliances for snoring and JESUS: a review. Sleep 2006; 29: 244-262.  5. Alo, et al. Oral appliance treatment for JESUS: an update. J Clin Sleep Med 2014; 10(2): 215-227.  6. Vladimir et al. Predictors of OSAH treatment outcome. J Dent Res 2007; 86: 7881-3696.      Weight Loss:    Weight loss is a long-term strategy that may improve sleep apnea in some patients.    Weight management is a personal decision and the decision should be based on your interest and the potential benefits.  If you are interested in exploring weight loss strategies, the following discussion covers the impact on weight loss on sleep apnea and the approaches that may be successful.    Being overweight does not necessarily mean you will have health consequences.   Those who have BMI over 35 or over 27 with existing medical conditions carries greater risk.   Weight loss decreases severity of sleep apnea in most people with obesity. For those with mild obesity who have developed snoring with weight gain, even 15-30 pound weight loss can improve and occasionally eliminate sleep apnea.  Structured and life-long dietary and health habits are necessary to lose weight and keep healthier weight levels.     Though there may be significant health benefits from weight loss, long-term weight loss is very difficult to achieve- studies show success with dietary management in less than 10% of people. In addition, substantial weight loss may require years of dietary control and may be difficult if patients have severe obesity. In these cases, surgical management may be considered.  Finally, older individuals who have tolerated obesity without health complications may be less likely to benefit from weight loss strategies.        Your BMI is Body mass index is 35.87 kg/m .  Weight management is a personal decision.  If you are interested in exploring weight loss strategies, the following discussion covers the approaches that may be successful. Body mass index (BMI) is one way to tell whether you are at a healthy weight, overweight, or obese. It measures your weight in relation to your height.  A BMI of 18.5 to 24.9 is in the healthy range. A person with a BMI of 25 to 29.9 is considered overweight, and someone with a BMI of 30 or greater is considered obese. More than two-thirds of American adults are considered overweight or obese.  Being overweight or obese increases the risk for further weight gain. Excess weight may lead to heart disease and diabetes.  Creating and following plans for healthy eating and physical activity may help you improve your health.  Weight control is part of healthy lifestyle and includes exercise, emotional health, and healthy eating habits. Careful eating habits  lifelong are the mainstay of weight control. Though there are significant health benefits from weight loss, long-term weight loss with diet alone may be very difficult to achieve- studies show long-term success with dietary management in less than 10% of people. Attaining a healthy weight may be especially difficult to achieve in those with severe obesity. In some cases, medications, devices and surgical management might be considered.  What can you do?  If you are overweight or obese and are interested in methods for weight loss, you should discuss this with your provider.     Consider reducing daily calorie intake by 500 calories.     Keep a food journal.     Avoiding skipping meals, consider cutting portions instead.    Diet combined with exercise helps maintain muscle while optimizing fat loss. Strength training is particularly important for building and maintaining muscle mass. Exercise helps reduce stress, increase energy, and improves fitness. Increasing exercise without diet control, however, may not burn enough calories to loose weight.       Start walking three days a week 10-20 minutes at a time    Work towards walking thirty minutes five days a week     Eventually, increase the speed of your walking for 1-2 minutes at time    In addition, we recommend that you review healthy lifestyles and methods for weight loss available through the National Institutes of Health patient information sites:  http://win.niddk.nih.gov/publications/index.htm    And look into health and wellness programs that may be available through your health insurance provider, employer, local community center, or dominic club.      Surgery:    Surgery for obstructive sleep apnea is considered generally only when other therapies fail to work. Surgery may be discussed with you if you are having a difficult time tolerating CPAP and or when there is an abnormal structure that requires surgical correction.  Nose and throat surgeries often  enlarge the airway to prevent collapse.  Most of these surgeries create pain for 1-2 weeks and up to half of the most common surgeries are not effective throughout life.  You should carefully discuss the benefits and drawbacks to surgery with your sleep provider and surgeon to determine if it is the best solution for you.   More information  Surgery for JESUS is directed at areas that are responsible for narrowing or complete obstruction of the airway during sleep.  There are a wide range of procedures available to enlarge and/or stabilize the airway to prevent blockage of breathing in the three major areas where it can occur: the palate, tongue, and nasal regions.  Successful surgical treatment depends on the accurate identification of the factors responsible for obstructive sleep apnea in each person.  A personalized approach is required because there is no single treatment that works well for everyone.  Because of anatomic variation, consultation with an examination by a sleep surgeon is a critical first step in determining what surgical options are best for each patient.  In some cases, examination during sedation may be recommended in order to guide the selection of procedures.  Patients will be counseled about risks and benefits as well as the typical recovery course after surgery. Surgery is typically not a cure for a person s JESUS.  However, surgery will often significantly improve one s JESUS severity (termed  success rate ).  Even in the absence of a cure, surgery will decrease the cardiovascular risk associated with OSA7; improve overall quality of life8 (sleepiness, functionality, sleep quality, etc).      Palate Procedures:  Patients with JESUS often have narrowing of their airway in the region of their tonsils and uvula.  The goals of palate procedures are to widen the airway in this region as well as to help the tissues resist collapse.  Modern palate procedure techniques focus on tissue conservation and  soft tissue rearrangement, rather than tissue removal.  Often the uvula is preserved in this procedure. Residual sleep apnea is common in patient after pharyngoplasty with an average reduction in sleep apnea events of 33%2.      Tongue Procedures:  ExamWhile patients are awake, the muscles that surround the throat are active and keep this region open for breathing. These muscles relax during sleep, allowing the tongue and other structures to collapse and block breathing.  There are several different tongue procedures available.  Selection of a tongue base procedure depends on characteristics seen on physical exam.  Generally, procedures are aimed at removing bulky tissues in this area or preventing the back of the tongue from falling back during sleep.  Success rates for tongue surgery range from 50-62%3.    Hypoglossal Nerve Stimulation:  Hypoglossal nerve stimulation has recently received approval from the [place] Food and Drug Administration for the treatment of obstructive sleep apnea.  This is based on research showing that the system was safe and effective in treating sleep apnea6.  Results showed that the median AHI score decreased 68%, from 29.3 to 9.0. This therapy uses an implant system that senses breathing patterns and delivers mild stimulation to airway muscles, which keeps the airway open during sleep.  The system consists of three fully implanted components: a small generator (similar in size to a pacemaker), a breathing sensor, and a stimulation lead.  Using a small handheld remote, a patient turns the therapy on before bed and off upon awakening.    Candidates for this device must be greater than 22 years of age, have moderate to severe JESUS (AHI between 20-65), BMI less than 32, have tried CPAP/oral appliance without success, and have appropriate upper airway anatomy (determined by a sleep endoscopy performed by Dr. Hinkle).    Hypoglossal Nerve Stimulation Pathway:    The sleep surgeon s office  will work with the patient through the insurance prior-authorization process (including communications and appeals).    Nasal Procedures:  Nasal obstruction can interfere with nasal breathing during the day and night.  Studies have shown that relief of nasal obstruction can improve the ability of some patients to tolerate positive airway pressure therapy for obstructive sleep apnea1.  Treatment options include medications such as nasal saline, topical corticosteroid and antihistamine sprays, and oral medications such as antihistamines or decongestants. Non-surgical treatments can include external nasal dilators for selected patients. If these are not successful by themselves, surgery can improve the nasal airway either alone or in combination with these other options.      Combination Procedures:  Combination of surgical procedures and other treatments may be recommended, particularly if patients have more than one area of narrowing or persistent positional disease.  The success rate of combination surgery ranges from 66-80%2,3.    References  1. Page VOGT. The Role of the Nose in Snoring and Obstructive Sleep Apnoea: An Update.  Eur Arch Otorhinolaryngol. 2011; 268: 1365-73.  2.  Attila SM; Lexx JA; Steve JR; Pallanch JF; Haile MB; Matthew SG; Alexx SNEED. Surgical modifications of the upper airway for obstructive sleep apnea in adults: a systematic review and meta-analysis. SLEEP 2010;33(10):8104-8952. Iron SALDANA. Hypopharyngeal surgery in obstructive sleep apnea: an evidence-based medicine review.  Arch Otolaryngol Head Neck Surg. 2006 Feb;132(2):206-13.  3. Alexis YH1, Nai Y, Bo JON. The efficacy of anatomically based multilevel surgery for obstructive sleep apnea. Otolaryngol Head Neck Surg. 2003 Oct;129(4):327-35.  4. Iron SALDANA, Goldberg A. Hypopharyngeal Surgery in Obstructive Sleep Apnea: An Evidence-Based Medicine Review. Arch Otolaryngol Head Neck Surg. 2006 Feb;132(2):206-13.  5. Sola BAXTER et al.  Upper-Airway Stimulation for Obstructive Sleep Apnea.  N Engl J Med. 2014 Jan 9;370(2):139-49.  6. Linwood Y et al. Increased Incidence of Cardiovascular Disease in Middle-aged Men with Obstructive Sleep Apnea. Am J Respir Crit Care Med; 2002 166: 159-165  7. Stephanie RADER et al. Studying Life Effects and Effectiveness of Palatopharyngoplasty (SLEEP) study: Subjective Outcomes of Isolated Uvulopalatopharyngoplasty. Otolaryngol Head Neck Surg. 2011; 144: 623-631.

## 2021-04-13 NOTE — PROGRESS NOTES
Judd is a 40 year old who is being evaluated via a billable video visit.      How would you like to obtain your AVS? MyChart  If the video visit is dropped, the invitation should be resent by: Text to cell phone: [phone]  Will anyone else be joining your video visit? No    Mimi Lucas CMA      Video-Visit Details    Type of service:  Video Visit  Video Start Time: 815AM  Video End Time:845AM    Originating Location (pt. Location): Home    Distant Location (provider location):  [facility]     Platform used for Video Visit: [facility]  Sleep Consultation Note     Date on this visit: 4/13/2021    Judd Nelson is sent by No ref. provider found for a sleep consultation regarding snoring, evaluation for possible sleep apnea    Primary Physician: Vonda Pop     Chief complaint: snoring, not waking up feeling rested    Judd Nelson 40 year old with PMH of obesity, asthma,  who complains of  snoring, not waking up feeling rested. She has not had a previous sleep study.    Sleep Disordered Breathing  Judd reports snoring, that has been disturbing 's sleep,  dry mouth, waking up around 4 AM with  headaches, non-refreshing sleep, daytime sleepiness/fatigue.  Denies witnessed apneas, snort arousals, choking/gasping for air.  She reports weight gain of 40 lbs (most weight gain occurred in the  last year)since her delivery 4 years ago.    Sleep Schedule/Sleep Complaints//Daytime Functioning  During workdays, Judd falls asleep at 10:30 PM and wakes up at 6 AM.  It usually takes < 5 minutes to fall asleep.  On non-work days, She falls asleep at 11/1130PM and gets up 6 AM.  She wakes up few  times throughout the night. Sometimes wakes up startled.  Night time awakenings occurs due to children at least once,mostly for   no apparent reason  After awakening, She is able to fall back asleep right away.  She reports non-refreshing sleep, daytime  sleepiness/fatigue.  By 6 pm she feels tired.   Patient is a night person.  Patient denies drowsiness while driving.    Judd used to nap when kids where little, not anymore since last couple of months the kids haven't been napping.  Patient does  use electronics in bed.    SLEEP SCALES:  Patient's [place] Sleepiness score    Insomnia severity index:18    Restless Legs symptoms  Judd does not complain of restlessness feelings in the legs.      Sleep Behaviors  She denies any cataplexy, sleep paralysis, sleep hallucination.  She denies any night time behaviors - sleep walking, sleep talking, sleep eating.  She does not complain acting out dreams.      Social History  Judd currently works 35 hrs/week, she works  from home 4 days/week and goes to her office 1 day/week.  , Lives with  and their triplets who are 4 years old.  She drinks tea 2-4 cups /day. Last caffeine intake is not within 6 hours of bed time.  She don't drink alcohol. Patient is a never smoker. Patient does not use drugs.    Allergies:    Allergies   Allergen Reactions     No Known Allergies        Medications:    Current Outpatient Medications   Medication Sig Dispense Refill     ibuprofen (ADVIL/MOTRIN) 600 MG tablet Take 1 tablet (600 mg) by mouth every 6 hours as needed for moderate pain 60 tablet 1     paragard intrauterine copper 1 each by Intrauterine route once         Problem List:  Patient Active Problem List    Diagnosis Date Noted     Family history of uterine cancer 2018     Priority: Medium     Menorrhagia with regular cycle 2018     Priority: Medium     S/P  section 2016     Priority: Medium     Advance Care Planning 2014     Priority: Medium     Advance Care Planning:   Initial facilitation introduction:   Judd Nelson presented for initial session regarding ACP at a group session. She was accompanied by no one. Honoring Choices information provided and resources reviewed. She currently  wishes to give additional consideration to ACP prior to documenting choices.  She currently has the following questions or concerns about Advance Care Planning: none known.  Confirmed/documented designated decision maker(s). See permanent comments section of demographics in clinical tab. Added by Joyce Flores on 11/3/2014           Obesity 04/10/2013     Priority: Medium        Past Medical/Surgical History:  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      Positive PPD      Severe hyperemesis gravidarum     GI tube placed during triplet pregnancy loss of 50#     Transaminitis     liver bx 16 resolving hepatitis     Past Surgical History:   Procedure Laterality Date      SECTION N/A 2016    Procedure:  SECTION;  Surgeon: Cheyenne Alfaro MD;  [facility]: [facility]     ENDOSCOPIC INSERTION TUBE JEJUNOSTOMY N/A 2016    Procedure: ENDOSCOPIC INSERTION TUBE JEJUNOSTOMY;  Surgeon: Chet Tavera MD;  [facility]     GYN SURGERY      hysteroscopic polypectomy     HC REPLACE DUODENOSTOMY/JEJUNOSTOMY TUBE PERCUTANEOUS N/A 2016    Procedure: REPLACE GASTROJEJUNOSTOMY TUBE, PERCUTANEOUS;  Surgeon: Weston Rios MD;  Location: [facility] OR     PERCUTANEOUS BIOPSY LIVER N/A 2016    Procedure: PERCUTANEOUS BIOPSY LIVER;  Surgeon: Weston Rios MD;  Location: [facility] OR       Social History:  Social History     Socioeconomic History     Marital status:      Spouse name: Not on file     Number of children: Not on file     Years of education: Not on file     Highest education level: Not on file   Occupational History     Not on file   Social Needs     Financial  resource strain: Not on file     Food insecurity     Worry: Not on file     Inability: Not on file     Transportation needs     Medical: Not on file     Non-medical: Not on file   Tobacco Use     Smoking status: Never Smoker     Smokeless tobacco: Never Used   Substance and Sexual Activity     Alcohol use: No     Alcohol/week: 0.0 standard drinks     Drug use: No     Sexual activity: Yes     Partners: Male     Birth control/protection: I.U.D.   Lifestyle     Physical activity     Days per week: Not on file     Minutes per session: Not on file     Stress: Not on file   Relationships     Social connections     Talks on phone: Not on file     Gets together: Not on file     Attends Muslim service: Not on file     Active member of club or organization: Not on file     Attends meetings of clubs or organizations: Not on file     Relationship status: Not on file     Intimate partner violence     Fear of current or ex partner: Not on file     Emotionally abused: Not on file     Physically abused: Not on file     Forced sexual activity: Not on file   Other Topics Concern     Parent/sibling w/ CABG, MI or angioplasty before 65F 55M? Not Asked   Social History Narrative     Not on file       Family History:  Family History   Problem Relation Age of Onset     Diabetes Father      Hypertension Father      Dementia Father      Neurologic Disorder Brother         epilepsy     Neurologic Disorder Brother         epilepsy     Cancer Mother         Endometrial     Hypertension Mother      Diabetes Sister      Cardiovascular Maternal Grandmother        Review of Systems:  A complete review of systems reviewed by me is negative with the exeption of what has been mentioned in the history of present illness,  and symptoms listed below, highlighted in red:  CONSTITUTIONAL: weight gain/loss, fever, chills, sweats or night sweats, drug allergies.  EYES:  changes in vision, blind spots, double vision.  ENT: ear pain, sore throat, sinus  "pain, post-nasal drip, runny nose, bloody nose  CARDIAC:  fast heartbeats or fluttering in chest, chest pain or pressure, breathlessness when lying flat, swollen legs or swollen feet.  NEUROLOGIC: headaches, weakness or numbness in the arms or legs.  DERMATOLOGIC:  rashes, new moles or change in mole(s)  PULMONARY: SOB at rest, SOB with activity, dry cough, productive cough, coughing up blood, wheezing or whistling when breathing.    GASTROINTESTINAL: nausea or vomiting two times last month, loose or watery stools, fat or grease in stools, constipation, abdominal pain, bowel movements black in color or blood noted.  GENITOURINARY: pain during urination, blood in urine, urinating more frequently than usual  MUSCULOSKELETAL:muscle pain, bone or joint pain, swollen joints.  ENDOCRINE: increased thirst or urination, diabetes.  LYMPHATICS: swollen lymph nodes, lumps or bumps in the breasts or nipple discharge.  PSYCHE: depression, anxiety, denies suicidal ideations/thoughts of harming others    Physical Examination:  Vitals: Ht 1.778 m (5' 10\")   Wt 113.4 kg (250 lb)   BMI 35.87 kg/m    BMI= Body mass index is 35.87 kg/m .      [place] Total Score 4/13/2021   Total score - [place] 2       General: No apparent distress, appropriately groomed  Head: Normocephalic, atraumatic  Eyes: no icterus   Chest: No cough, no audible wheezing, able to talk in full sentences  Skin: no rash  Psych: coherent speech, normal rate and volume, able to articulate logical thoughts, able   to abstract reason, no tangential thoughts, no hallucinations   or delusions  Her affect is normal  Neuro:  Mental status: Alert and  Oriented X 3  Speech: normal   Gait: Normal    No focal neurological deficit       Impression/Plan:  Snoring,  non restorative sleep, fatigue . Possible Obstructive sleep apnea  STOP BANG score is 3/8  Patient likely has sleep apnea based on clinical history, body habitus.    HST/ Polysomnography reviewed. She is interested " "in HST. Orders generated for Noxturnal and Watch PAT and patient will be scheduled  the study for whichever is available first. If HST is negative will consider PSG she was agreeable with plan.  Obstructive sleep apnea reviewed.  Information provided regarding treatment options for JESUS.      Obesity: We discussed weight management with healthy diet, and exercise.    We discussed optimizing sleep hygiene including avoiding electronics use in bed.    Patient was strongly advised to avoid driving, operating any heavy machinery or other hazardous situations while drowsy or sleepy.  Patient was counseled on the importance of driving while alert, to pull over if drowsy, or nap before getting into the vehicle if sleepy.        Plan is to communicate results of sleep study in 10-14 days via video visit.     CC: No ref. provider found    The above note was dictated using voice recognition software. Although reviewed after completion, some word and grammatical error may remain . Please contact the author for any clarifications.    \"I spent a total of 40 minutes with Judd Nelson during today's  Video visit, most of this time was spent counseling the patient and  coordinating care regarding JESUS, HST/PSG, weight management, sleep hygiene  and chart review., including documentation and further activities as noted above.\"    Luiz Duggan MD  [facility]   [address], [address]   [address] [place] [address]   Appointments: [phone]               "

## 2021-04-22 ENCOUNTER — DOCUMENTATION ONLY (OUTPATIENT)
Dept: SLEEP MEDICINE | Facility: CLINIC | Age: 41
End: 2021-04-22

## 2021-04-22 ENCOUNTER — OFFICE VISIT (OUTPATIENT)
Dept: SLEEP MEDICINE | Facility: CLINIC | Age: 41
End: 2021-04-22
Payer: COMMERCIAL

## 2021-04-22 DIAGNOSIS — G47.9 SLEEP DISTURBANCE: Primary | ICD-10-CM

## 2021-04-22 DIAGNOSIS — R06.83 SNORING: ICD-10-CM

## 2021-04-22 PROCEDURE — G0399 HOME SLEEP TEST/TYPE 3 PORTA: HCPCS | Mod: 52 | Performed by: INTERNAL MEDICINE

## 2021-04-23 ENCOUNTER — DOCUMENTATION ONLY (OUTPATIENT)
Dept: SLEEP MEDICINE | Facility: CLINIC | Age: 41
End: 2021-04-23
Payer: COMMERCIAL

## 2021-04-23 NOTE — PROGRESS NOTES
This HSAT was performed using a Noxturnal T3 device which recorded snore, sound, movement activity, body position, nasal pressure, oronasal thermal airflow, pulse, oximetry and both chest and abdominal respiratory effort. HSAT data was restricted to the time patient states they were in bed.     HSAT was scored using 1B 4% hypopnea rule.     HST AHI (Non-PAT): 1.2  Snoring was reported as mild and moderate.  Time with SpO2 below 89% was 0 minutes.   Overall signal quality was good     Pt will follow up with sleep provider to determine appropriate therapy.       HST POST-STUDY QUESTIONNAIRE    1. What time did you go to bed?  10:15 p.m.  2. How long do you think it took to fall asleep?  10 min  3. What time did you wake up to start the day?  4:15 a.m.  4. Did you get up during the night at all?  yes  5. If you woke up, do you remember approximately what time(s)? 1:00 a.m., 3:30 a.m.  6. Did you have any difficulty with the equipment?  No  7. Did you us any type of treatment with this study?  None  8. Was the head of the bed elevated? No  9. Did you sleep in a recliner?  No  10. Did you stop using CPAP at least 3 days before this test?  NA  11. Any other information you'd like us to know? no

## 2021-05-03 NOTE — PROCEDURES
"HOME SLEEP STUDY INTERPRETATION    Patient: Judd Nelson  MRN: [id number]  YOB: 1980  Study Date: 4/22/2021  Referring Provider: Vonda Pop  Ordering Provider: Luiz Duggan MD     Indications for Home Study: Judd Nelson is a 40 year old female with a history of obesity, asthma, who complains of  snoring, not waking up feeling rested.   Home sleep study was obtained to evaluate for possible obstructive sleep apnea.    Estimated body mass index is 35.87 kg/m  as calculated from the following:    Height as of 4/13/21: 1.778 m (5' 10\").    Weight as of 4/13/21: 113.4 kg (250 lb).  Total score - [place]: 2 (4/13/2021  7:00 AM)  STOP-BANG: 3/8    Data: A full night home sleep study was performed recording the standard physiologic parameters including body position, movement, sound, nasal pressure, thermal oral airflow, chest and abdominal movements with respiratory inductance plethysmography, and oxygen saturation by pulse oximetry. Pulse rate was estimated by oximetry recording. This study was considered adequate based on > 4 hours of quality oximetry and respiratory recording. As specified by the AASM Manual for the Scoring of Sleep and Associated events, version 2.3, Rule VIII.D 1B, 4% oxygen desaturation scoring for hypopneas is used as a standard of care on all home sleep apnea testing.    Analysis Time: 344 minutes    Respiration:   Sleep Associated Hypoxemia: sustained hypoxemia was not present.  Average oxygen saturation was 94.6%.  Time with saturation less than or equal to 88% was 0 minutes. The lowest oxygen saturation was 90%.   Snoring: Snoring was present(mild to moderate intensity).  Respiratory events: The home study revealed a presence of 0 obstructive apneas and 3 mixed and central apneas. There were 4 hypopneas resulting in a combined apnea/hypopnea index [AHI] of 1.2 events per hour.  AHI was 2.3 per hour supine, n/a prone, 0 per hour on left side, and " 0 per hour on right side.   Pattern: Excluding events noted above, respiratory rate and pattern was Normal.    Position: Percent of time spent: supine -52.7%, prone -0%, on left -9.3%, on right - 38%.    Heart Rate: By pulse oximetry, the average pulse rate was normal at 75 bpm.  The minimum pulse rate was 64 bpm and the maximum pulse rate was 100 bpm.    Assessment:   Snoring was reported.  However the study does not show evidence of clinically significant sleep-related breathing disorder.    Recommendations:  Consider obtaining an overnight polysomnography to re-evaluate for possible JESUS, if the clinical index of suspicion for sleep apnea is high. Home sleep apnea testing may lack sensitivity to identify mild   disease.  Suggest optimizing sleep hygiene and avoiding sleep deprivation.  Weight management.    Diagnosis Code(s): Snoring R06.83, Sleep disorder unspecified G 47.9    Luiz Duggan MD, May 3, 2021   Diplomate, American Board of Internal Medicine, Sleep Medicine

## 2021-05-17 ENCOUNTER — VIRTUAL VISIT (OUTPATIENT)
Dept: SLEEP MEDICINE | Facility: CLINIC | Age: 41
End: 2021-05-17
Payer: COMMERCIAL

## 2021-05-17 VITALS — HEIGHT: 70 IN | WEIGHT: 250 LBS | BODY MASS INDEX: 35.79 KG/M2

## 2021-05-17 DIAGNOSIS — R06.83 SNORING: Primary | ICD-10-CM

## 2021-05-17 DIAGNOSIS — R40.0 SLEEPINESS: ICD-10-CM

## 2021-05-17 PROCEDURE — 99212 OFFICE O/P EST SF 10 MIN: CPT | Mod: 95 | Performed by: PHYSICIAN ASSISTANT

## 2021-05-17 ASSESSMENT — MIFFLIN-ST. JEOR: SCORE: 1894.24

## 2021-05-17 NOTE — PROGRESS NOTES
Judd is a 40 year old who is being evaluated via a billable video visit.      How would you like to obtain your AVS? MyChart  If the video visit is dropped, the invitation should be resent by: Text to cell phone: [phone]  Will anyone else be joining your video visit? No      Video Start Time: 9:46 AM  Video-Visit Details    Type of service:  Video Visit    Video End Time:9:58 AM    Originating Location (pt. Location): Home    Distant Location (provider location):  [facility]     Platform used for Video Visit: [email or web address]

## 2021-05-17 NOTE — PROGRESS NOTES
Sleep Follow-Up Visit:    Date on this visit: 5/17/2021    Judd Nelson comes in today for follow-up of her sleep study done on 4/22/21. She was initially seen for snoring, not waking up feeling rested. Her medical history is significant for PMH of obesity, asthma.     Analysis Time: 344 minutes  Wt: 250#   Respiration:   Sleep Associated Hypoxemia: sustained hypoxemia was not present.  Average oxygen saturation was 94.6%.  Time with saturation less than or equal to 88% was 0 minutes. The lowest oxygen saturation was 90%.   Snoring: Snoring was present (mild to moderate intensity).  Respiratory events: The home study revealed a presence of 0 obstructive apneas and 3 mixed and central apneas. There were 4 hypopneas resulting in a combined apnea/hypopnea index [AHI] of 1.2 events per hour.  AHI was 2.3 per hour supine, n/a prone, 0 per hour on left side, and 0 per hour on right side.   Pattern: Excluding events noted above, respiratory rate and pattern was Normal.     Position: Percent of time spent: supine -52.7%, prone -0%, on left -9.3%, on right - 38%.     Heart Rate: By pulse oximetry, the average pulse rate was normal at 75 bpm.  The minimum pulse rate was 64 bpm and the maximum pulse rate was 100 bpm.    Her ferritin was 10 in 2018. She does not think she was on iron.  She has triplets who recently turned 4. They just started sleeping through the night a little better, but she feels more tired when she wakes now.     She felt she slept poorly on the night of the test. She had been sleeping propped up on pillows leading up to the test. She slept flat on the night of the test and slept more poorly. The snoring has been less with sleeping elevated. She is less tired because she is sleeping better, but her neck and shoulders are more achy when she wakes.  The test was performed during Ramadan, so she did not sleep as much as normal.     Past medical/surgical history, family history, social history, medications and  allergies were reviewed.      Problem List:  Patient Active Problem List    Diagnosis Date Noted     Family history of uterine cancer 2018     Priority: Medium     Menorrhagia with regular cycle 2018     Priority: Medium     S/P  section 2016     Priority: Medium     Advance Care Planning 2014     Priority: Medium     Advance Care Planning:   Initial facilitation introduction:   Judd Nelson presented for initial session regarding ACP at a group session. She was accompanied by no one. Honoring Choices information provided and resources reviewed. She currently wishes to give additional consideration to ACP prior to documenting choices.  She currently has the following questions or concerns about Advance Care Planning: none known.  Confirmed/documented designated decision maker(s). See permanent comments section of demographics in clinical tab. Added by Joyce Flores on 11/3/2014           Obesity 04/10/2013     Priority: Medium        Impression/Plan:    (R06.83) Snoring  (primary encounter diagnosis), (R40.0) Sleepiness, (Z68.35) BMI 35.0-35.9,adult  Comment: This test did not show sleep apnea. She does not feel she slept well. She would like to do a more sensitive test.  Plan:  Comprehensive Sleep Study        In lab PSG. She requests a female tech. I advised her to have a visit with her primary to assess other sources of low energy as she had a ferritin of 10 in 2018.    She will follow up with me in about 2 week(s) after her study.     18 minutes were spent on the date of the encounter doing chart review, history and exam, documentation and further activities as noted above.     Bennett Goltz, PA-C    CC: Vonda Pop MD

## 2021-05-17 NOTE — PATIENT INSTRUCTIONS
Your BMI is Body mass index is 35.87 kg/m .  Weight management is a personal decision.  If you are interested in exploring weight loss strategies, the following discussion covers the approaches that may be successful. Body mass index (BMI) is one way to tell whether you are at a healthy weight, overweight, or obese. It measures your weight in relation to your height.  A BMI of 18.5 to 24.9 is in the healthy range. A person with a BMI of 25 to 29.9 is considered overweight, and someone with a BMI of 30 or greater is considered obese. More than two-thirds of American adults are considered overweight or obese.  Being overweight or obese increases the risk for further weight gain. Excess weight may lead to heart disease and diabetes.  Creating and following plans for healthy eating and physical activity may help you improve your health.  Weight control is part of healthy lifestyle and includes exercise, emotional health, and healthy eating habits. Careful eating habits lifelong are the mainstay of weight control. Though there are significant health benefits from weight loss, long-term weight loss with diet alone may be very difficult to achieve- studies show long-term success with dietary management in less than 10% of people. Attaining a healthy weight may be especially difficult to achieve in those with severe obesity. In some cases, medications, devices and surgical management might be considered.  What can you do?  If you are overweight or obese and are interested in methods for weight loss, you should discuss this with your provider.     Consider reducing daily calorie intake by 500 calories.     Keep a food journal.     Avoiding skipping meals, consider cutting portions instead.    Diet combined with exercise helps maintain muscle while optimizing fat loss. Strength training is particularly important for building and maintaining muscle mass. Exercise helps reduce stress, increase energy, and improves fitness.  Increasing exercise without diet control, however, may not burn enough calories to loose weight.       Start walking three days a week 10-20 minutes at a time    Work towards walking thirty minutes five days a week     Eventually, increase the speed of your walking for 1-2 minutes at time    In addition, we recommend that you review healthy lifestyles and methods for weight loss available through the National Institutes of Health patient information sites:  http://win.niddk.nih.gov/publications/index.htm    And look into health and wellness programs that may be available through your health insurance provider, employer, local community center, or dominic club.    Weight management plan: Patient was referred to their PCP to discuss a diet and exercise plan.

## 2021-06-03 ENCOUNTER — THERAPY VISIT (OUTPATIENT)
Dept: SLEEP MEDICINE | Facility: CLINIC | Age: 41
End: 2021-06-03
Payer: COMMERCIAL

## 2021-06-03 ENCOUNTER — DOCUMENTATION ONLY (OUTPATIENT)
Dept: SLEEP MEDICINE | Facility: CLINIC | Age: 41
End: 2021-06-03

## 2021-06-03 DIAGNOSIS — R40.0 SLEEPINESS: ICD-10-CM

## 2021-06-03 DIAGNOSIS — R06.83 SNORING: ICD-10-CM

## 2021-06-03 PROCEDURE — 95810 POLYSOM 6/> YRS 4/> PARAM: CPT | Performed by: INTERNAL MEDICINE

## 2021-06-10 LAB — SLPCOMP: NORMAL

## 2021-06-10 NOTE — PROCEDURES
" SLEEP STUDY INTERPRETATION  DIAGNOSTIC POLYSOMNOGRAPHY REPORT      Patient: JOHN SERRATO  YOB: 1980  Study Date: 6/3/2021  MRN: [id number]  Referring Provider: self  Ordering Provider: Goltz, PA-C, Bennett    Indications for Polysomnography: The patient is a 40-year-old Female who is 5' 10\" and weighs 250.0 lbs. Her BMI is 36.2, Gilliam sleepiness scale 2 and neck circumference is - cm. Her medical history is significant for obesity and asthma. She reports snoring and not waking up feeling rested.  Home sleep study done on 4/22/21 was negative for JESUS, with AHI of 1.2 per hour. A diagnostic polysomnogram was performed to evaluate for sleep apnea/ hypoxemia.    Polysomnogram Data: A full night polysomnogram recorded the standard physiologic parameters including EEG, EOG, EMG, ECG, nasal and oral airflow. Respiratory parameters of chest and abdominal movements were recorded with respiratory inductance plethysmography. Oxygen saturation was recorded by pulse oximetry. Hypopnea scoring rule used: 1B 4%.  .   Sleep Architecture: Sleep architecture was remarkable for good sleep efficiency. All sleep stages were observed.  Increased total sleep time, (compared to the history provided by patient during the initial sleep clinic visit) and increase in REM sleep are suggestive of possible sleep deprivation.  The total recording time of the polysomnogram was 566.9 minutes. The total sleep time was 529.5 minutes. Sleep latency was normal at 10.5 minutes without the use of a sleep aid. REM latency was 114.5 minutes. Arousal index was normal at 13.1 arousals per hour. Sleep efficiency was normal at 93.4%. Wake after sleep onset was 26.5 minutes. The patient spent 2.8% of total sleep time in Stage N1, 55.9% in Stage N2, 14.8% in Stage N3, and 26.4% in REM. Time in REM supine was 40.5 minutes.    Respiration: Snoring was reported, but there is no evidence of clinically significant sleep-related breathing disorder.  "   Events ? The polysomnogram revealed a presence of - obstructive, - central, and - mixed apneas resulting in an apnea index of - events per hour. There were 5 obstructive hypopneas and - central hypopneas resulting in an obstructive hypopnea index of 0.6 and central hypopnea index of - events per hour. The combined apnea/hypopnea index was 0.6 events per hour (central apnea/hypopnea index was - events per hour). The REM AHI was 0.9 events per hour. The supine AHI was 0.7 events per hour. The RERA index was 0.3 events per hour.  The RDI was 0.9 events per hour.    Snoring - was reported as mild to moderate. Snoring was reported during both lateral and supine sleep but was pronounced during supine sleep.    Respiratory rate and pattern - was notable for normal respiratory rate and pattern.    Sustained Sleep Associated Hypoventilation - Transcutaneous carbon dioxide monitoring was not used, however significant hypoventilation was not suggested by oximetry.    Sleep Associated Hypoxemia - (Greater than 5 minutes O2 sat at or below 88%) was not present. Baseline oxygen saturation was 96.4%. Lowest oxygen saturation was 88.1%. Time spent less than or equal to 88% was 0 minutes. Time spent less than or equal to 89% was 0 minutes.    Movement Activity: There were no significant periodic limb movements during the study.  There were no abnormal sleep-related behaviors.    Periodic Limb Activity - There were 24 PLMs during the entire study. The PLM index was 2.7 movements per hour. The PLM Arousal Index was 0.1 per hour.    REM EMG Activity - Excessive transient/sustained muscle activity was not present.    Nocturnal Behavior - Abnormal sleep related behaviors were not noted during/arising out of NREM / REM sleep.     Bruxism - None apparent.    Cardiac Summary: Normal sinus rhythm was noted.  The average pulse rate was 77.3 bpm. The minimum pulse rate was 59.4 bpm while the maximum pulse rate was 105.9 bpm.  Arrhythmias  were not noted.    Assessment:     Snoring was reported, but there is no evidence of clinically significant sleep-related breathing disorder.      Sleep architecture was remarkable for good sleep efficiency. All sleep stages were observed.  Increased total sleep time, (compared to the history provided by patient during the initial sleep clinic visit) and increase in REM sleep are suggestive of possible sleep deprivation.     There were no significant periodic limb movements during the study.      There were no abnormal sleep-related behaviors.     Normal sinus rhythm was noted.    Recommendations:    Suggest the option of dental appliance through referral to Sleep Dentistry for the treatment of snoring.    Suggest optimizing sleep schedule and avoiding sleep deprivation.    Advice regarding the risks of drowsy driving.    Weight management (if BMI > 30).    Pharmacologic therapy should be used for management of restless legs syndrome only if present and clinically indicated and not based on the presence of periodic limb movements alone.    Diagnostic Codes:   Unspecified Sleep Disturbance G47.9  Snoring R06.83    6/3/2021 [facility] Sleep Study (250.0 lbs) - AHI 0.6, RDI 0.9, Supine AHI 0.7, REM AHI 0.9, Low O2 88.1%, Time Spent ?88% 0 minutes / Time Spent ?89% 0 minutes.       _____________________________________   Electronically Signed By: (Silvia Duggan MD), 6/9/21

## 2021-07-04 NOTE — PROGRESS NOTES
Sleep Follow-Up Visit:    Date on this visit: 2021    Judd Nelson comes in today for follow-up of her sleep study done on 6/3/21. Judd Nelson was initially seen for snoring and not waking up rested. Her medical history is significant for obesity, asthma.     She had an HST on 21 that showed an AHI of 1.2/hr. An in lab study was ordered to verify it was not an underestimation, especially given she did not feel she slept well that night.     PSG Results:  Weight: 250 pounds  Sleep latency 10.5 minutes without Ambien.  REM achieved.   REM latency 114.5 minutes.  Sleep efficiency 93.4%. Total sleep time 529.5 minutes.    Sleep architecture:  Stage 1, 2.8% (5%), stage 2, 55.9% (45-55%), stage 3, 14.8% (15-20%), stage REM, 26.4% (20-25%).  AHI was 0.6/hr, with desaturations down to 88%. S/He spent 0 minutes below 90% SpO2 and 0 minutes below 89% SpO2. RDI 0.9/hr.  REM RDI 0.9/hr, consistent with no REM JESUS.  Supine RDI 1.5/hr, consistent with no SUPINE JESUS.  Periodic Limb Movement Index 2.7/hour, 0.1/hr associated with arousals.       CPAP titration:  Treatment was not indicated These findings were reviewed with the patient.    Past medical/surgical history, family history, social history, medications and allergies were reviewed.      Problem List:  Patient Active Problem List    Diagnosis Date Noted     Family history of uterine cancer 2018     Priority: Medium     Menorrhagia with regular cycle 2018     Priority: Medium     S/P  section 2016     Priority: Medium     Advance Care Planning 2014     Priority: Medium     Advance Care Planning:   Initial facilitation introduction:   Judd Nelson presented for initial session regarding ACP at a group session. She was accompanied by no one. [facility] information provided and resources reviewed. She currently wishes to give additional consideration to ACP prior to documenting choices.  She currently has the following questions  or concerns about Advance Care Planning: none known.  Confirmed/documented designated decision maker(s). See permanent comments section of demographics in clinical tab. Added by Joyce Flores on 11/3/2014           Obesity 04/10/2013     Priority: Medium        Impression/Plan:    (R06.83) Snoring  (primary encounter diagnosis)  Comment: This PSG did not show apnea, AHI 0.6/hr. She had 40 minutes of REM supine with no events. She has some sleepiness, which may be related to having triplet 4 year olds.  Plan: We discussed that insurances do not cover treatment for snoring in the absence of apnea. We discussed options including over-the-counter dental devices such asSnoreRX, dental referral for a professionally made appliance, weight loss, and mouth exercises.     She will follow up with me as needed.     28 minutes were spent on the date of the encounter doing chart review, history and exam, documentation and further activities as noted above.     Bennett Goltz, PA-C    CC: No ref. provider found

## 2021-07-05 ENCOUNTER — VIRTUAL VISIT (OUTPATIENT)
Dept: SLEEP MEDICINE | Facility: CLINIC | Age: 41
End: 2021-07-05
Payer: COMMERCIAL

## 2021-07-05 VITALS — WEIGHT: 230 LBS | BODY MASS INDEX: 33 KG/M2

## 2021-07-05 DIAGNOSIS — R06.83 SNORING: Primary | ICD-10-CM

## 2021-07-05 PROCEDURE — 99213 OFFICE O/P EST LOW 20 MIN: CPT | Mod: 95 | Performed by: PHYSICIAN ASSISTANT

## 2021-07-05 NOTE — PROGRESS NOTES
Judd is a 40 year old who is being evaluated via a billable video visit.      How would you like to obtain your AVS? MyChart  If the video visit is dropped, the invitation should be resent by: Text to cell phone: [phone]  Will anyone else be joining your video visit? Madonna Lobo      Video Start Time: 1:39 PM  Video-Visit Details    Type of service:  Video Visit    Video End Time:2:01 PM    Originating Location (pt. Location): Home    Distant Location (provider location):  [facility]     Platform used for Video Visit: ContentDJ

## 2021-07-05 NOTE — PATIENT INSTRUCTIONS
Try a SnoreRX dental appliance available online.     If desired, you could talk with a dentist about a professionally made appliance. Below is a list of dentists.    You may also try mouth exercises as described in the following article: https://www.[facility].NaHere/entry/how-to-stop-snoring-_n_7657348    Dentist who treat snoring and sleep apnea:    [facility] (Medicare)  Provider: Bakari Trejo DDS   [address] [place] [address]  Phone: [phone]  Fax: [phone]    [facility]  Providers: Jonathan Strong DDS, Lexis Serra DDS, Tc Mcclain DDS  Fax [phone]  Locations:   [phone]  [facility]  [address]  -[place]  [address]  [facility]  [address] [address]   [facility]  [address]   [phone]  [facility]  [address]  [address]  -[place]  [address] [facility] (Medicare)  Providers: William Pereira DDS, FAGD, Kelsey Bloom DDS, MS, Delia Pozo DDS, Tania Lucero DDS  [address], [place] [address]  Phone: [phone]  Fax: [phone]    [facility]  Provider: Hetaher Chavira DDS, PhD    [address] [place] [address]  Phone: [phone]  Fax: [phone]    [address] [place] [address] [place] [address]  Phone: [phone]  Fax: [phone]      [facility]  Provider: Tomas Banda DDS  Address: [address], [place] [address]  Phone: [phone]      [facility]   Provider: Lalo Ceja DDS  [facility]  [address] [place] [address]  Appointments: [phone]    [facility]   Provider: Beck Case DDS   [address], [address]   [place] [address]   Appointments: [phone]   Fax: [phone]     [facility]   Provider: Sami Lee DDS, MS - [DME Medicare]  [facility]   [address], [address]   [place] [address]   Appointments: [phone]   Fax: [phone]     [facility]  Provider: Enzo Culver DMD, MSD - [DME Medicare]  [address], [address]  [place] [address]  Appointments [phone]  Fax: [phone]    [facility]   [facility]   Provider: Tian Waddell DDS, [facility]  [address] [place] [address] [address]  [place] [address]   Appointments: [phone]  Fax: [phone]     [facility]   Providers: Nuno Mcgrath DMD, Beck Case DDS   [address]   [place] [address]   Appointments: [phone]     [facility]   Providers: Dao Howell DDS, Tc Adler DDS  [address], [address]   [place] [address]   Appointments: [phone]   Fax: [phone]     Provider: Adnrew Jama DDS  [address]  [address]  [phone]

## 2021-09-26 ENCOUNTER — HEALTH MAINTENANCE LETTER (OUTPATIENT)
Age: 41
End: 2021-09-26

## 2021-11-23 ENCOUNTER — E-VISIT (OUTPATIENT)
Dept: URGENT CARE | Facility: URGENT CARE | Age: 41
End: 2021-11-23
Payer: COMMERCIAL

## 2021-11-23 DIAGNOSIS — J02.9 SORE THROAT: ICD-10-CM

## 2021-11-23 DIAGNOSIS — Z20.822 SUSPECTED COVID-19 VIRUS INFECTION: ICD-10-CM

## 2021-11-23 PROCEDURE — 99421 OL DIG E/M SVC 5-10 MIN: CPT | Performed by: FAMILY MEDICINE

## 2021-11-23 NOTE — PATIENT INSTRUCTIONS
Dear Judd Nelson,    Your symptoms show that you may have coronavirus (COVID-19). This illness can cause fever, cough and trouble breathing. Many people get a mild case and get better on their own. Some people can get very sick.    Because you also reported sore throat I would like to also test you for Strep Throat to determine if we need to treat you for that as well.    What should I do?  We would like to test you for Covid-19 virus and Strep Throat. I have placed orders for these tests.     For all employees or close contacts (except [employer] and [employer] - see below), go to your [employer] home page and scroll down to the section that says  You have an appointment that needs to be scheduled  and click the large green button that says  Schedule Now  and follow the steps to find the next available opening.  It is important that when you are asked what the reason for your appointment is that you mention you need BOTH Covid and Strep tests.  tests.     If you are unable to complete these steps or if you cannot find any available times, please call [phone] to schedule employee testing.     [employer] employees or close contacts, please call [phone].   [place] ([employer]) employees or close contacts call [phone].    Return to work/school/ guidance:  Please let your workplace manager and staffing office know when your quarantine ends     We can t give you an exact date as it depends on the above. You can calculate this on your own or work with your manager/staffing office to calculate this. (For example if you were exposed on 10/4, you would have to quarantine for 14 full days. That would be through 10/18. You could return on 10/19.)      If you receive a positive COVID-19 test result, follow the guidance of the those who are giving you the results. Usually the return to work is 10 (or in some cases 20 days from symptom onset.) If you work at [employer], you must also be cleared by Employee  Occupational Health and Safety to return to work.        If you receive a negative COVID-19 test result and did not have a high risk exposure to someone with a known positive COVID-19 test, you can return to work once you're free of fever for 24 hours without fever-reducing medication and your symptoms are improving or resolved.      If you receive a negative COVID-19 test and If you had a high risk exposure to someone who has tested positive for COVID-19 then you can return to work 14 days after your last contact with the positive individual    Note: If you have ongoing exposure to the covid positive person, this quarantine period may be more than 14 days. (For example, if you are continued to be exposed to your child who tested positive and cannot isolate from them, then the quarantine of 7-14 days can't start until your child is no longer contagious. This is typically 10 days from onset of the child's symptoms. So the total duration may be 17-24 days in this case.)    Sign up for [employer].   We know it's scary to hear that you might have COVID-19. We want to track your symptoms to make sure you're okay over the next 2 weeks. Please look for an email from [employer]--this is a free, online program that we'll use to keep in touch. To sign up, follow the link in the email you will receive. Learn more at [email or web address]    How can I take care of myself?    Get lots of rest. Drink extra fluids (unless a doctor has told you not to)    Take Tylenol (acetaminophen) or ibuprofen for fever or pain. If you have liver or kidney problems, ask your family doctor if it's okay to take Tylenol o ibuprofen    If you have other health problems (like cancer, heart failure, an organ transplant or severe kidney disease): Call your specialty clinic if you don't feel better in the next 2 days.    Know when to call 911. Emergency warning signs include:  o Trouble breathing or shortness of breath  o Pain or  pressure in the chest that doesn't go away  o Feeling confused like you haven't felt before, or not being able to wake up  o Bluish-colored lips or face    Where can I get more information?  [facility] - About COVID-19:   [email or web address]    CDC - What to Do If You're Sick:   www.cdc.gov/coronavirus/2019-ncov/about/steps-when-sick.html

## 2021-11-24 ENCOUNTER — LAB (OUTPATIENT)
Dept: URGENT CARE | Facility: URGENT CARE | Age: 41
End: 2021-11-24
Attending: FAMILY MEDICINE
Payer: COMMERCIAL

## 2021-11-24 DIAGNOSIS — J02.9 SORE THROAT: ICD-10-CM

## 2021-11-24 DIAGNOSIS — Z20.822 SUSPECTED COVID-19 VIRUS INFECTION: ICD-10-CM

## 2021-11-24 LAB
DEPRECATED S PYO AG THROAT QL EIA: NEGATIVE
GROUP A STREP BY PCR: NOT DETECTED

## 2021-11-24 PROCEDURE — U0005 INFEC AGEN DETEC AMPLI PROBE: HCPCS

## 2021-11-24 PROCEDURE — 87651 STREP A DNA AMP PROBE: CPT

## 2021-11-24 PROCEDURE — U0003 INFECTIOUS AGENT DETECTION BY NUCLEIC ACID (DNA OR RNA); SEVERE ACUTE RESPIRATORY SYNDROME CORONAVIRUS 2 (SARS-COV-2) (CORONAVIRUS DISEASE [COVID-19]), AMPLIFIED PROBE TECHNIQUE, MAKING USE OF HIGH THROUGHPUT TECHNOLOGIES AS DESCRIBED BY CMS-2020-01-R: HCPCS

## 2021-11-25 LAB — SARS-COV-2 RNA RESP QL NAA+PROBE: NEGATIVE

## 2022-05-07 ENCOUNTER — HEALTH MAINTENANCE LETTER (OUTPATIENT)
Age: 42
End: 2022-05-07

## 2023-04-23 ENCOUNTER — HEALTH MAINTENANCE LETTER (OUTPATIENT)
Age: 43
End: 2023-04-23

## 2023-06-02 ENCOUNTER — HEALTH MAINTENANCE LETTER (OUTPATIENT)
Age: 43
End: 2023-06-02

## 2024-02-10 ENCOUNTER — HEALTH MAINTENANCE LETTER (OUTPATIENT)
Age: 44
End: 2024-02-10

## 2024-06-29 ENCOUNTER — HEALTH MAINTENANCE LETTER (OUTPATIENT)
Age: 44
End: 2024-06-29